# Patient Record
Sex: FEMALE | Race: BLACK OR AFRICAN AMERICAN | Employment: PART TIME | URBAN - METROPOLITAN AREA
[De-identification: names, ages, dates, MRNs, and addresses within clinical notes are randomized per-mention and may not be internally consistent; named-entity substitution may affect disease eponyms.]

---

## 2019-03-27 ENCOUNTER — APPOINTMENT (OUTPATIENT)
Dept: GENERAL RADIOLOGY | Age: 24
End: 2019-03-27

## 2019-03-27 ENCOUNTER — HOSPITAL ENCOUNTER (EMERGENCY)
Age: 24
Discharge: HOME | End: 2019-03-27

## 2019-03-27 VITALS
RESPIRATION RATE: 20 BRPM | WEIGHT: 250 LBS | DIASTOLIC BLOOD PRESSURE: 66 MMHG | OXYGEN SATURATION: 99 % | BODY MASS INDEX: 35.79 KG/M2 | HEART RATE: 90 BPM | HEIGHT: 70 IN | TEMPERATURE: 98.2 F | SYSTOLIC BLOOD PRESSURE: 134 MMHG

## 2019-03-27 DIAGNOSIS — J45.40 MODERATE PERSISTENT ASTHMATIC BRONCHITIS WITHOUT COMPLICATION: Primary | ICD-10-CM

## 2019-03-27 PROCEDURE — 71046 X-RAY EXAM CHEST 2 VIEWS: CPT

## 2019-03-27 PROCEDURE — 6370000000 HC RX 637 (ALT 250 FOR IP): Performed by: PHYSICIAN ASSISTANT

## 2019-03-27 PROCEDURE — 99283 EMERGENCY DEPT VISIT LOW MDM: CPT

## 2019-03-27 RX ORDER — IPRATROPIUM BROMIDE AND ALBUTEROL SULFATE 2.5; .5 MG/3ML; MG/3ML
1 SOLUTION RESPIRATORY (INHALATION)
Status: DISCONTINUED | OUTPATIENT
Start: 2019-03-27 | End: 2019-03-27 | Stop reason: HOSPADM

## 2019-03-27 RX ORDER — IPRATROPIUM BROMIDE AND ALBUTEROL SULFATE 2.5; .5 MG/3ML; MG/3ML
1 SOLUTION RESPIRATORY (INHALATION) ONCE
Status: COMPLETED | OUTPATIENT
Start: 2019-03-27 | End: 2019-03-27

## 2019-03-27 RX ORDER — PREDNISONE 10 MG/1
40 TABLET ORAL DAILY
Qty: 20 TABLET | Refills: 0 | Status: SHIPPED | OUTPATIENT
Start: 2019-03-27 | End: 2019-04-01

## 2019-03-27 RX ORDER — ALBUTEROL SULFATE 90 UG/1
2 AEROSOL, METERED RESPIRATORY (INHALATION) EVERY 6 HOURS PRN
Qty: 1 INHALER | Refills: 0 | Status: SHIPPED | OUTPATIENT
Start: 2019-03-27

## 2019-03-27 RX ORDER — PREDNISONE 20 MG/1
60 TABLET ORAL ONCE
Status: COMPLETED | OUTPATIENT
Start: 2019-03-27 | End: 2019-03-27

## 2019-03-27 RX ORDER — ALBUTEROL SULFATE 90 UG/1
2 AEROSOL, METERED RESPIRATORY (INHALATION) EVERY 6 HOURS PRN
COMMUNITY
End: 2019-03-27 | Stop reason: SDUPTHER

## 2019-03-27 RX ADMIN — IPRATROPIUM BROMIDE AND ALBUTEROL SULFATE 1 AMPULE: .5; 3 SOLUTION RESPIRATORY (INHALATION) at 19:10

## 2019-03-27 RX ADMIN — PREDNISONE 60 MG: 20 TABLET ORAL at 19:35

## 2019-03-27 RX ADMIN — IPRATROPIUM BROMIDE AND ALBUTEROL SULFATE 1 AMPULE: .5; 3 SOLUTION RESPIRATORY (INHALATION) at 19:36

## 2019-03-27 SDOH — HEALTH STABILITY: MENTAL HEALTH: HOW OFTEN DO YOU HAVE A DRINK CONTAINING ALCOHOL?: NEVER

## 2019-03-27 ASSESSMENT — PAIN DESCRIPTION - FREQUENCY: FREQUENCY: CONTINUOUS

## 2019-03-27 ASSESSMENT — PAIN SCALES - GENERAL: PAINLEVEL_OUTOF10: 7

## 2019-03-27 ASSESSMENT — PAIN DESCRIPTION - LOCATION: LOCATION: GENERALIZED

## 2019-03-27 ASSESSMENT — PAIN DESCRIPTION - DESCRIPTORS: DESCRIPTORS: ACHING

## 2019-03-27 ASSESSMENT — PAIN DESCRIPTION - PAIN TYPE: TYPE: ACUTE PAIN

## 2021-06-24 ENCOUNTER — TELEPHONE (OUTPATIENT)
Dept: ADMINISTRATIVE | Age: 26
End: 2021-06-24

## 2024-06-18 ENCOUNTER — ANCILLARY PROCEDURE (OUTPATIENT)
Dept: OBGYN CLINIC | Age: 29
End: 2024-06-18
Payer: MEDICAID

## 2024-06-18 ENCOUNTER — INITIAL PRENATAL (OUTPATIENT)
Dept: OBGYN CLINIC | Age: 29
End: 2024-06-18
Payer: MEDICAID

## 2024-06-18 VITALS
SYSTOLIC BLOOD PRESSURE: 124 MMHG | WEIGHT: 245 LBS | DIASTOLIC BLOOD PRESSURE: 80 MMHG | HEART RATE: 98 BPM | BODY MASS INDEX: 35.15 KG/M2

## 2024-06-18 DIAGNOSIS — Z98.890 HISTORY OF LOOP ELECTROSURGICAL EXCISION PROCEDURE (LEEP) OF CERVIX AFFECTING PREGNANCY IN SECOND TRIMESTER: ICD-10-CM

## 2024-06-18 DIAGNOSIS — O34.42 HISTORY OF LOOP ELECTROSURGICAL EXCISION PROCEDURE (LEEP) OF CERVIX AFFECTING PREGNANCY IN SECOND TRIMESTER: ICD-10-CM

## 2024-06-18 DIAGNOSIS — N96 HABITUAL ABORTER: ICD-10-CM

## 2024-06-18 DIAGNOSIS — Z34.90 FIFTH PREGNANCY: ICD-10-CM

## 2024-06-18 DIAGNOSIS — Z3A.19 19 WEEKS GESTATION OF PREGNANCY: Primary | ICD-10-CM

## 2024-06-18 DIAGNOSIS — O26.22 HABITUAL ABORTER, CURRENTLY PREGNANT IN SECOND TRIMESTER: ICD-10-CM

## 2024-06-18 DIAGNOSIS — D56.3 ALPHA THALASSEMIA SILENT CARRIER: ICD-10-CM

## 2024-06-18 LAB
GLUCOSE URINE, POC: NORMAL
PROTEIN UA: NEGATIVE

## 2024-06-18 PROCEDURE — 99999 PR OFFICE/OUTPT VISIT,PROCEDURE ONLY: CPT | Performed by: OBSTETRICS & GYNECOLOGY

## 2024-06-18 PROCEDURE — 99203 OFFICE O/P NEW LOW 30 MIN: CPT | Performed by: OBSTETRICS & GYNECOLOGY

## 2024-06-18 PROCEDURE — 76811 OB US DETAILED SNGL FETUS: CPT | Performed by: OBSTETRICS & GYNECOLOGY

## 2024-06-18 PROCEDURE — 76817 TRANSVAGINAL US OBSTETRIC: CPT | Performed by: OBSTETRICS & GYNECOLOGY

## 2024-06-18 PROCEDURE — 81002 URINALYSIS NONAUTO W/O SCOPE: CPT | Performed by: OBSTETRICS & GYNECOLOGY

## 2024-06-18 RX ORDER — ASPIRIN 81 MG/1
81 TABLET, COATED ORAL DAILY
COMMUNITY
Start: 2024-04-26

## 2024-06-18 RX ORDER — DIPHENHYDRAMINE HCL 25 MG
25 TABLET ORAL EVERY 6 HOURS PRN
COMMUNITY

## 2024-06-18 RX ORDER — ONDANSETRON 4 MG/1
4 TABLET, FILM COATED ORAL 4 TIMES DAILY
COMMUNITY
Start: 2024-06-06

## 2024-06-18 RX ORDER — THIAMINE MONONITRATE, RIBOFLAVIN, PYRIDOXINE HYDROCHLORIDE, CYANOCOBALAMIN, ASCORBIC ACID, NIACIN, FOLIC ACID, FERROUS FUMARATE, CALCIUM CARBONATE, CHOLECALCIFEROL, VITAMIN E ACETATE, POTASSIUM IODIDE, ZINC OXIDE, CHOLINE BITARTRATE, AND DOCONEXENT
KIT
COMMUNITY

## 2024-06-18 RX ORDER — FOLIC ACID 1 MG/1
1000 TABLET ORAL DAILY
COMMUNITY
Start: 2024-04-26

## 2024-06-18 NOTE — PROGRESS NOTES
[facility] [place] [facility] [place] [facility]  [address] [place], [place] [address] [address]  Dept: [phone]  Loc: [phone]     2024    RE:  Manasa Spann     : 1995   AGE: 29 y.o.     Dear Dr. Ferguson,    Thank you for allowing me to see Manasa Spann.  As I'm sure you will recall, Manasa Spann is a 29 y.o. [id number] LMP recorded. Patient is pregnant. 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 2024    History of loop electrosurgical excision procedure (LEEP) of cervix affecting pregnancy in second trimester 2024    Alpha thalassemia silent carrier 2024        PAST HISTORY:  OB History    Para Term  AB Living   5       4     SAB IAB Ectopic Molar Multiple Live Births   4                # Outcome Date GA Lbr Oscar/2nd Weight Sex Delivery Anes PTL Lv   5 Current            4 SAB            3 SAB            2 SAB            1 SAB                   MEDICAL:  Past Medical History:   Diagnosis Date    Abnormal Pap smear of cervix     Anemia     Asthma     Depression         SURGICAL:  Past Surgical History:   Procedure Laterality Date    ANKLE SURGERY      LEEP         ALLERGIES:    No Known Allergies      MEDICATIONS:    Current Outpatient Medications   Medication Sig Dispense Refill    folic acid (FOLVITE) 1 MG tablet Take 1 tablet by mouth daily      ondansetron (ZOFRAN) 4 MG tablet Take 1 tablet by mouth in the morning, at noon, in the evening, and at bedtime      ASPIRIN LOW DOSE 81 MG EC tablet Take 1 tablet by mouth daily      Prenatal-FeFum-FA-DHA w/o A (PRENATAL + DHA) 27-1 & 250 MG THPK Take by mouth      diphenhydrAMINE (BENADRYL) 25 MG tablet Take 1 tablet by mouth every 6 hours as needed for Itching

## 2024-06-18 NOTE — PROGRESS NOTES
Patient is here today for ob new visit. Denies any vaginal bleeding now. Having a white vaginal discharge. Noticed some blood when she wiped last week but has been constipated.   Having cramping and pelvic pain.

## 2024-07-02 ENCOUNTER — ANCILLARY PROCEDURE (OUTPATIENT)
Dept: OBGYN CLINIC | Age: 29
End: 2024-07-02
Payer: MEDICAID

## 2024-07-02 ENCOUNTER — ROUTINE PRENATAL (OUTPATIENT)
Dept: OBGYN CLINIC | Age: 29
End: 2024-07-02
Payer: MEDICAID

## 2024-07-02 VITALS
WEIGHT: 247 LBS | BODY MASS INDEX: 35.44 KG/M2 | SYSTOLIC BLOOD PRESSURE: 121 MMHG | HEART RATE: 80 BPM | DIASTOLIC BLOOD PRESSURE: 71 MMHG

## 2024-07-02 DIAGNOSIS — Z98.890 HISTORY OF LOOP ELECTROSURGICAL EXCISION PROCEDURE (LEEP) OF CERVIX AFFECTING PREGNANCY IN SECOND TRIMESTER: ICD-10-CM

## 2024-07-02 DIAGNOSIS — N96 HABITUAL ABORTER: ICD-10-CM

## 2024-07-02 DIAGNOSIS — O34.42 HISTORY OF LOOP ELECTROSURGICAL EXCISION PROCEDURE (LEEP) OF CERVIX AFFECTING PREGNANCY IN SECOND TRIMESTER: ICD-10-CM

## 2024-07-02 DIAGNOSIS — Z3A.21 21 WEEKS GESTATION OF PREGNANCY: Primary | ICD-10-CM

## 2024-07-02 LAB
GLUCOSE URINE, POC: NORMAL
PROTEIN UA: NEGATIVE

## 2024-07-02 PROCEDURE — 99213 OFFICE O/P EST LOW 20 MIN: CPT | Performed by: OBSTETRICS & GYNECOLOGY

## 2024-07-02 PROCEDURE — 76815 OB US LIMITED FETUS(S): CPT | Performed by: OBSTETRICS & GYNECOLOGY

## 2024-07-02 PROCEDURE — 81002 URINALYSIS NONAUTO W/O SCOPE: CPT | Performed by: OBSTETRICS & GYNECOLOGY

## 2024-07-02 PROCEDURE — 99999 PR OFFICE/OUTPT VISIT,PROCEDURE ONLY: CPT | Performed by: OBSTETRICS & GYNECOLOGY

## 2024-07-02 PROCEDURE — 76817 TRANSVAGINAL US OBSTETRIC: CPT | Performed by: OBSTETRICS & GYNECOLOGY

## 2024-07-02 NOTE — PROGRESS NOTES
Patient is here today for 2 wk f/u. Denies any vaginal bleeding. Having vaginal discharge and cramping. Feels like she has to pee but does not.

## 2024-07-02 NOTE — PROGRESS NOTES
[facility] [place] [facility]  [address] [place], [place] [address] [address]  Dept: [phone]  Loc: [phone]     2024    RE:  Manasa Spann     : 1995   AGE: 29 y.o.     Dear Dr. Ferguson,    Thank you for allowing me to see Manasa Spann.  As I'm sure you will recall, Manasa Spann is a 29 y.o. [id number] LMP recorded. Patient is pregnant. 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 2024    History of loop electrosurgical excision procedure (LEEP) of cervix affecting pregnancy in second trimester 2024    Alpha thalassemia silent carrier 2024        PAST HISTORY:  OB History    Para Term  AB Living   5       4     SAB IAB Ectopic Molar Multiple Live Births   4                # Outcome Date GA Lbr Oscar/2nd Weight Sex Delivery Anes PTL Lv   5 Current            4 SAB            3 SAB            2 SAB            1 SAB                   MEDICAL:  Past Medical History:   Diagnosis Date    Abnormal Pap smear of cervix     Anemia     Asthma     Depression         SURGICAL:  Past Surgical History:   Procedure Laterality Date    ANKLE SURGERY      LEEP         ALLERGIES:    No Known Allergies      MEDICATIONS:    Current Outpatient Medications   Medication Sig Dispense Refill    folic acid (FOLVITE) 1 MG tablet Take 1 tablet by mouth daily      ondansetron (ZOFRAN) 4 MG tablet Take 1 tablet by mouth in the morning, at noon, in the evening, and at bedtime      ASPIRIN LOW DOSE 81 MG EC tablet Take 1 tablet by mouth daily      Prenatal-FeFum-FA-DHA w/o A (PRENATAL + DHA) 27-1 & 250 MG THPK Take by mouth      diphenhydrAMINE (BENADRYL) 25 MG tablet Take 1

## 2024-07-09 ENCOUNTER — ANCILLARY PROCEDURE (OUTPATIENT)
Dept: OBGYN CLINIC | Age: 29
End: 2024-07-09
Payer: MEDICAID

## 2024-07-09 ENCOUNTER — ROUTINE PRENATAL (OUTPATIENT)
Dept: OBGYN CLINIC | Age: 29
End: 2024-07-09
Payer: MEDICAID

## 2024-07-09 VITALS
HEART RATE: 80 BPM | SYSTOLIC BLOOD PRESSURE: 120 MMHG | WEIGHT: 249 LBS | BODY MASS INDEX: 35.73 KG/M2 | DIASTOLIC BLOOD PRESSURE: 72 MMHG

## 2024-07-09 DIAGNOSIS — O34.42 HISTORY OF LOOP ELECTROSURGICAL EXCISION PROCEDURE (LEEP) OF CERVIX AFFECTING PREGNANCY IN SECOND TRIMESTER: ICD-10-CM

## 2024-07-09 DIAGNOSIS — Z3A.22 22 WEEKS GESTATION OF PREGNANCY: Primary | ICD-10-CM

## 2024-07-09 DIAGNOSIS — Z98.890 HISTORY OF LOOP ELECTROSURGICAL EXCISION PROCEDURE (LEEP) OF CERVIX AFFECTING PREGNANCY IN SECOND TRIMESTER: ICD-10-CM

## 2024-07-09 DIAGNOSIS — N96 HABITUAL ABORTER: ICD-10-CM

## 2024-07-09 LAB
GLUCOSE URINE, POC: NORMAL
PROTEIN UA: NEGATIVE

## 2024-07-09 PROCEDURE — 81002 URINALYSIS NONAUTO W/O SCOPE: CPT | Performed by: OBSTETRICS & GYNECOLOGY

## 2024-07-09 PROCEDURE — 99213 OFFICE O/P EST LOW 20 MIN: CPT | Performed by: OBSTETRICS & GYNECOLOGY

## 2024-07-09 PROCEDURE — 99999 PR OFFICE/OUTPT VISIT,PROCEDURE ONLY: CPT | Performed by: OBSTETRICS & GYNECOLOGY

## 2024-07-09 PROCEDURE — 76817 TRANSVAGINAL US OBSTETRIC: CPT | Performed by: OBSTETRICS & GYNECOLOGY

## 2024-07-09 PROCEDURE — 76815 OB US LIMITED FETUS(S): CPT | Performed by: OBSTETRICS & GYNECOLOGY

## 2024-07-09 NOTE — PROGRESS NOTES
Patient is here today for 1 wk f/u. Patient's OB may have different due date. Had some vaginal bleeding a few days ago.   Cramping on and off.

## 2024-07-09 NOTE — PROGRESS NOTES
[facility] [place] [facility]  [address] [place], [place] [address] [address]  Dept: [phone]  Loc: [phone]     2024    RE:  Manasa Spann     : 1995   AGE: 29 y.o.     Dear Dr. Ferguson,    Thank you for allowing me to see Manasa Spann.  As I'm sure you will recall, Manasa Spann is a 29 y.o. [id number] LMP recorded. Patient is pregnant. 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 2024    History of loop electrosurgical excision procedure (LEEP) of cervix affecting pregnancy in second trimester 2024    Alpha thalassemia silent carrier 2024        PAST HISTORY:  OB History    Para Term  AB Living   5       4     SAB IAB Ectopic Molar Multiple Live Births   4                # Outcome Date GA Lbr Oscar/2nd Weight Sex Delivery Anes PTL Lv   5 Current            4 SAB            3 SAB            2 SAB            1 SAB                   MEDICAL:  Past Medical History:   Diagnosis Date    Abnormal Pap smear of cervix     Anemia     Asthma     Depression         SURGICAL:  Past Surgical History:   Procedure Laterality Date    ANKLE SURGERY      LEEP         ALLERGIES:    No Known Allergies      MEDICATIONS:    Current Outpatient Medications   Medication Sig Dispense Refill    folic acid (FOLVITE) 1 MG tablet Take 1 tablet by mouth daily      ondansetron (ZOFRAN) 4 MG tablet Take 1 tablet by mouth in the morning, at noon, in the evening, and at bedtime      ASPIRIN LOW DOSE 81 MG EC tablet Take 1 tablet by mouth daily      Prenatal-FeFum-FA-DHA w/o A (PRENATAL + DHA) 27-1 & 250 MG THPK Take by mouth      diphenhydrAMINE (BENADRYL) 25 MG tablet Take 1

## 2024-07-16 ENCOUNTER — ANCILLARY PROCEDURE (OUTPATIENT)
Dept: OBGYN CLINIC | Age: 29
End: 2024-07-16
Payer: MEDICAID

## 2024-07-16 ENCOUNTER — ROUTINE PRENATAL (OUTPATIENT)
Dept: OBGYN CLINIC | Age: 29
End: 2024-07-16
Payer: MEDICAID

## 2024-07-16 VITALS
BODY MASS INDEX: 35.58 KG/M2 | SYSTOLIC BLOOD PRESSURE: 127 MMHG | HEART RATE: 99 BPM | DIASTOLIC BLOOD PRESSURE: 72 MMHG | WEIGHT: 248 LBS

## 2024-07-16 DIAGNOSIS — N96 HABITUAL ABORTER: Primary | ICD-10-CM

## 2024-07-16 DIAGNOSIS — Z98.890 HISTORY OF LOOP ELECTROSURGICAL EXCISION PROCEDURE (LEEP) OF CERVIX AFFECTING PREGNANCY IN SECOND TRIMESTER: ICD-10-CM

## 2024-07-16 DIAGNOSIS — O36.5920 POOR FETAL GROWTH AFFECTING MANAGEMENT OF MOTHER IN SECOND TRIMESTER, SINGLE OR UNSPECIFIED FETUS: ICD-10-CM

## 2024-07-16 DIAGNOSIS — O34.42 HISTORY OF LOOP ELECTROSURGICAL EXCISION PROCEDURE (LEEP) OF CERVIX AFFECTING PREGNANCY IN SECOND TRIMESTER: ICD-10-CM

## 2024-07-16 PROBLEM — Z3A.23 23 WEEKS GESTATION OF PREGNANCY: Status: ACTIVE | Noted: 2024-07-02

## 2024-07-16 LAB
GLUCOSE URINE, POC: NORMAL
PROTEIN UA: NEGATIVE

## 2024-07-16 PROCEDURE — 76816 OB US FOLLOW-UP PER FETUS: CPT | Performed by: OBSTETRICS & GYNECOLOGY

## 2024-07-16 PROCEDURE — 81002 URINALYSIS NONAUTO W/O SCOPE: CPT | Performed by: OBSTETRICS & GYNECOLOGY

## 2024-07-16 PROCEDURE — 99213 OFFICE O/P EST LOW 20 MIN: CPT | Performed by: OBSTETRICS & GYNECOLOGY

## 2024-07-16 PROCEDURE — 99999 PR OFFICE/OUTPT VISIT,PROCEDURE ONLY: CPT | Performed by: OBSTETRICS & GYNECOLOGY

## 2024-07-16 PROCEDURE — 76817 TRANSVAGINAL US OBSTETRIC: CPT | Performed by: OBSTETRICS & GYNECOLOGY

## 2024-07-16 PROCEDURE — H1000 PRENATAL CARE ATRISK ASSESSM: HCPCS | Performed by: OBSTETRICS & GYNECOLOGY

## 2024-07-16 RX ORDER — PANTOPRAZOLE SODIUM 20 MG/1
TABLET, DELAYED RELEASE ORAL
COMMUNITY
Start: 2024-07-02

## 2024-07-16 NOTE — PROGRESS NOTES
[facility] [place] [facility]  [address] [place] [address]  [place] [address]  Dept: [phone]  Loc: [phone]     2024    RE:  Manasa Spann     : 1995   AGE: 29 y.o.     Dear Dr. Ferguson,    Thank you for allowing me to see Manasa Spann.  As I'm sure you will recall, Manasa Spann is a 29 y.o. [id number] LMP recorded. Patient is pregnant. Estimated Date of Delivery: 24 at 23w5d seen in our office today for the following:    REASON FOR VISIT: Growth and cervical length    Patient Active Problem List    Diagnosis Date Noted    Poor fetal growth affecting management of mother in second trimester 2024    23 weeks gestation of pregnancy 2024    Fifth pregnancy 2024    Habitual aborter, currently pregnant in second trimester 2024    History of loop electrosurgical excision procedure (LEEP) of cervix affecting pregnancy in second trimester 2024    Alpha thalassemia silent carrier 2024        PAST HISTORY:  OB History    Para Term  AB Living   5       4     SAB IAB Ectopic Molar Multiple Live Births   4                # Outcome Date GA Lbr Oscar/2nd Weight Sex Delivery Anes PTL Lv   5 Current            4 SAB            3 SAB            2 SAB            1 SAB                   MEDICAL:  Past Medical History:   Diagnosis Date    Abnormal Pap smear of cervix     Anemia     Asthma     Depression         SURGICAL:  Past Surgical History:   Procedure Laterality Date    ANKLE SURGERY      LEEP         ALLERGIES:    No Known Allergies      MEDICATIONS:    Current Outpatient Medications   Medication Sig Dispense Refill    pantoprazole (PROTONIX) 20 MG tablet       folic acid (FOLVITE) 1 MG tablet Take 1 tablet by mouth daily      ondansetron (ZOFRAN) 4 MG tablet Take 1 tablet by mouth in the morning, at noon, in the evening, and at bedtime      ASPIRIN LOW DOSE 81 MG EC tablet Take 1 tablet by mouth daily

## 2024-07-16 NOTE — PROGRESS NOTES
Patient is here today for growth. Denies any vaginal bleeding, or leakage of fluids.  Cramping lower abdominal area right side and moves to top of left side as well.

## 2024-07-23 ENCOUNTER — ANCILLARY PROCEDURE (OUTPATIENT)
Dept: OBGYN CLINIC | Age: 29
End: 2024-07-23
Payer: MEDICAID

## 2024-07-23 ENCOUNTER — ROUTINE PRENATAL (OUTPATIENT)
Dept: OBGYN CLINIC | Age: 29
End: 2024-07-23
Payer: MEDICAID

## 2024-07-23 VITALS
HEART RATE: 93 BPM | BODY MASS INDEX: 36.3 KG/M2 | WEIGHT: 253 LBS | DIASTOLIC BLOOD PRESSURE: 73 MMHG | SYSTOLIC BLOOD PRESSURE: 129 MMHG

## 2024-07-23 DIAGNOSIS — O35.2XX0 HEREDITARY DISEASE IN FAMILY POSSIBLY AFFECTING FETUS, AFFECTING MANAGEMENT OF MOTHER IN PREGNANCY, SINGLE OR UNSPECIFIED FETUS: ICD-10-CM

## 2024-07-23 DIAGNOSIS — O36.5920 POOR FETAL GROWTH AFFECTING MANAGEMENT OF MOTHER IN SECOND TRIMESTER, SINGLE OR UNSPECIFIED FETUS: ICD-10-CM

## 2024-07-23 DIAGNOSIS — O34.42 HISTORY OF LOOP ELECTROSURGICAL EXCISION PROCEDURE (LEEP) OF CERVIX AFFECTING PREGNANCY IN SECOND TRIMESTER: ICD-10-CM

## 2024-07-23 DIAGNOSIS — O26.22 HABITUAL ABORTER, CURRENTLY PREGNANT IN SECOND TRIMESTER: ICD-10-CM

## 2024-07-23 DIAGNOSIS — O99.210 OBESITY AFFECTING PREGNANCY, ANTEPARTUM, UNSPECIFIED OBESITY TYPE: ICD-10-CM

## 2024-07-23 DIAGNOSIS — D56.3 ALPHA THALASSEMIA SILENT CARRIER: ICD-10-CM

## 2024-07-23 DIAGNOSIS — Z98.890 HISTORY OF LOOP ELECTROSURGICAL EXCISION PROCEDURE (LEEP) OF CERVIX AFFECTING PREGNANCY IN SECOND TRIMESTER: ICD-10-CM

## 2024-07-23 DIAGNOSIS — Z3A.24 24 WEEKS GESTATION OF PREGNANCY: ICD-10-CM

## 2024-07-23 LAB
GLUCOSE URINE, POC: NORMAL
PROTEIN UA: NEGATIVE

## 2024-07-23 PROCEDURE — 99214 OFFICE O/P EST MOD 30 MIN: CPT | Performed by: OBSTETRICS & GYNECOLOGY

## 2024-07-23 PROCEDURE — 1036F TOBACCO NON-USER: CPT | Performed by: OBSTETRICS & GYNECOLOGY

## 2024-07-23 PROCEDURE — 76819 FETAL BIOPHYS PROFIL W/O NST: CPT | Performed by: OBSTETRICS & GYNECOLOGY

## 2024-07-23 PROCEDURE — 76821 MIDDLE CEREBRAL ARTERY ECHO: CPT | Performed by: OBSTETRICS & GYNECOLOGY

## 2024-07-23 PROCEDURE — 76817 TRANSVAGINAL US OBSTETRIC: CPT | Performed by: OBSTETRICS & GYNECOLOGY

## 2024-07-23 PROCEDURE — 81002 URINALYSIS NONAUTO W/O SCOPE: CPT | Performed by: OBSTETRICS & GYNECOLOGY

## 2024-07-23 PROCEDURE — G8427 DOCREV CUR MEDS BY ELIG CLIN: HCPCS | Performed by: OBSTETRICS & GYNECOLOGY

## 2024-07-23 PROCEDURE — 76815 OB US LIMITED FETUS(S): CPT | Performed by: OBSTETRICS & GYNECOLOGY

## 2024-07-23 PROCEDURE — G8419 CALC BMI OUT NRM PARAM NOF/U: HCPCS | Performed by: OBSTETRICS & GYNECOLOGY

## 2024-07-23 PROCEDURE — 99213 OFFICE O/P EST LOW 20 MIN: CPT | Performed by: OBSTETRICS & GYNECOLOGY

## 2024-07-23 PROCEDURE — 76820 UMBILICAL ARTERY ECHO: CPT | Performed by: OBSTETRICS & GYNECOLOGY

## 2024-07-23 NOTE — PROGRESS NOTES
Patient is here today for 1 wk f/u. Denies any vaginal bleeding, or leakage of fluids.  Cramping on and off.   Slight movement.   
recommendations  Reviewing available records, results of all previously ordered testing/procedures, and current problem list  Counseling/educating the patient  Coordinating care with other healthcare professionals  Communicating results to the patient's family/caregiver  Documenting clinical information in the patient's electronic health record   I answered all of her questions to her satisfaction.   I asked her to call if she had any additional questions.       If you have any questions regarding her management, please contact me at your convenience and thank you for allowing me to participate in her care.    Sincerely,        Shaheed Smith MD, MS, FACOG, FACS, RDCS, RDMS, RVT  Director Maternal-Fetal Medicine  [facility]  [phone]

## 2024-07-30 ENCOUNTER — ANCILLARY PROCEDURE (OUTPATIENT)
Dept: OBGYN CLINIC | Age: 29
End: 2024-07-30
Payer: MEDICAID

## 2024-07-30 ENCOUNTER — ROUTINE PRENATAL (OUTPATIENT)
Dept: OBGYN CLINIC | Age: 29
End: 2024-07-30
Payer: MEDICAID

## 2024-07-30 VITALS
DIASTOLIC BLOOD PRESSURE: 75 MMHG | WEIGHT: 250 LBS | SYSTOLIC BLOOD PRESSURE: 130 MMHG | BODY MASS INDEX: 35.87 KG/M2 | HEART RATE: 96 BPM

## 2024-07-30 DIAGNOSIS — O36.5920 POOR FETAL GROWTH AFFECTING MANAGEMENT OF MOTHER IN SECOND TRIMESTER, SINGLE OR UNSPECIFIED FETUS: ICD-10-CM

## 2024-07-30 DIAGNOSIS — Z3A.25 25 WEEKS GESTATION OF PREGNANCY: Primary | ICD-10-CM

## 2024-07-30 LAB
GLUCOSE URINE, POC: NORMAL
PROTEIN UA: NEGATIVE

## 2024-07-30 PROCEDURE — 81002 URINALYSIS NONAUTO W/O SCOPE: CPT | Performed by: OBSTETRICS & GYNECOLOGY

## 2024-07-30 PROCEDURE — 76821 MIDDLE CEREBRAL ARTERY ECHO: CPT | Performed by: OBSTETRICS & GYNECOLOGY

## 2024-07-30 PROCEDURE — 76820 UMBILICAL ARTERY ECHO: CPT | Performed by: OBSTETRICS & GYNECOLOGY

## 2024-07-30 PROCEDURE — 99213 OFFICE O/P EST LOW 20 MIN: CPT | Performed by: OBSTETRICS & GYNECOLOGY

## 2024-07-30 PROCEDURE — 76819 FETAL BIOPHYS PROFIL W/O NST: CPT | Performed by: OBSTETRICS & GYNECOLOGY

## 2024-07-30 PROCEDURE — 76815 OB US LIMITED FETUS(S): CPT | Performed by: OBSTETRICS & GYNECOLOGY

## 2024-07-30 PROCEDURE — 99999 PR OFFICE/OUTPT VISIT,PROCEDURE ONLY: CPT | Performed by: OBSTETRICS & GYNECOLOGY

## 2024-08-06 ENCOUNTER — ROUTINE PRENATAL (OUTPATIENT)
Dept: OBGYN CLINIC | Age: 29
End: 2024-08-06
Payer: MEDICAID

## 2024-08-06 ENCOUNTER — ANCILLARY PROCEDURE (OUTPATIENT)
Dept: OBGYN CLINIC | Age: 29
End: 2024-08-06
Payer: MEDICAID

## 2024-08-06 VITALS
HEART RATE: 84 BPM | WEIGHT: 251 LBS | BODY MASS INDEX: 36.01 KG/M2 | SYSTOLIC BLOOD PRESSURE: 113 MMHG | DIASTOLIC BLOOD PRESSURE: 75 MMHG

## 2024-08-06 DIAGNOSIS — Z3A.26 26 WEEKS GESTATION OF PREGNANCY: Primary | ICD-10-CM

## 2024-08-06 DIAGNOSIS — O36.5920 POOR FETAL GROWTH AFFECTING MANAGEMENT OF MOTHER IN SECOND TRIMESTER, SINGLE OR UNSPECIFIED FETUS: ICD-10-CM

## 2024-08-06 PROCEDURE — 76818 FETAL BIOPHYS PROFILE W/NST: CPT | Performed by: OBSTETRICS & GYNECOLOGY

## 2024-08-06 PROCEDURE — 99213 OFFICE O/P EST LOW 20 MIN: CPT | Performed by: OBSTETRICS & GYNECOLOGY

## 2024-08-06 PROCEDURE — 99999 PR OFFICE/OUTPT VISIT,PROCEDURE ONLY: CPT | Performed by: OBSTETRICS & GYNECOLOGY

## 2024-08-06 PROCEDURE — 76816 OB US FOLLOW-UP PER FETUS: CPT | Performed by: OBSTETRICS & GYNECOLOGY

## 2024-08-06 PROCEDURE — 76820 UMBILICAL ARTERY ECHO: CPT | Performed by: OBSTETRICS & GYNECOLOGY

## 2024-08-06 NOTE — PROGRESS NOTES
[facility] [place] [facility]  [address] [place] [address]  [place] [address]  Dept: [phone]  Loc: [phone]     2024    RE:  Manasa Spann     : 1995   AGE: 29 y.o.     Dear Dr. Ferguson,    Thank you for allowing me to see Manasa Spann.  As I'm sure you will recall, Manasa Spann is a 29 y.o. [id number] LMP recorded. Patient is pregnant. Estimated Date of Delivery: 24 at 26w5d seen in our office today for the following:    REASON FOR VISIT: Growth, BPP, NST, umbilical Dopplers    Patient Active Problem List    Diagnosis Date Noted    26 weeks gestation of pregnancy 2024    Maternal obesity affecting pregnancy, antepartum 2024    Hereditary disease in family possibly affecting fetus 2024    Poor fetal growth affecting management of mother in second trimester 2024    Fifth pregnancy 2024    Habitual aborter, currently pregnant in second trimester 2024    History of loop electrosurgical excision procedure (LEEP) of cervix affecting pregnancy in second trimester 2024    Alpha thalassemia silent carrier 2024        PAST HISTORY:  OB History    Para Term  AB Living   5       4     SAB IAB Ectopic Molar Multiple Live Births   4                # Outcome Date GA Lbr Oscar/2nd Weight Sex Delivery Anes PTL Lv   5 Current            4 SAB            3 SAB            2 SAB            1 SAB                   MEDICAL:  Past Medical History:   Diagnosis Date    Abnormal Pap smear of cervix     Anemia     Asthma     Depression         SURGICAL:  Past Surgical History:   Procedure Laterality Date    ANKLE SURGERY      LEEP         ALLERGIES:    No Known Allergies      MEDICATIONS:    Current Outpatient Medications   Medication Sig Dispense Refill    pantoprazole (PROTONIX) 20 MG tablet       folic acid (FOLVITE) 1 MG tablet Take 1 tablet by mouth daily      ondansetron (ZOFRAN) 4 MG tablet Take

## 2024-08-06 NOTE — PROGRESS NOTES
Patient is here today for growth. Denies any vaginal bleeding, or leakage of fluids.  Cramping bad since yesterday after lifting something heavy.   Slight movement.

## 2024-08-20 ENCOUNTER — ROUTINE PRENATAL (OUTPATIENT)
Dept: OBGYN CLINIC | Age: 29
End: 2024-08-20
Payer: MEDICAID

## 2024-08-20 ENCOUNTER — ANCILLARY PROCEDURE (OUTPATIENT)
Dept: OBGYN CLINIC | Age: 29
End: 2024-08-20
Payer: MEDICAID

## 2024-08-20 VITALS
HEART RATE: 97 BPM | BODY MASS INDEX: 35.44 KG/M2 | SYSTOLIC BLOOD PRESSURE: 128 MMHG | DIASTOLIC BLOOD PRESSURE: 74 MMHG | WEIGHT: 247 LBS

## 2024-08-20 DIAGNOSIS — D56.3 ALPHA THALASSEMIA SILENT CARRIER: ICD-10-CM

## 2024-08-20 DIAGNOSIS — Z98.890 HISTORY OF LOOP ELECTROSURGICAL EXCISION PROCEDURE (LEEP) OF CERVIX AFFECTING PREGNANCY IN SECOND TRIMESTER: ICD-10-CM

## 2024-08-20 DIAGNOSIS — Z3A.28 28 WEEKS GESTATION OF PREGNANCY: ICD-10-CM

## 2024-08-20 DIAGNOSIS — O36.5920 POOR FETAL GROWTH AFFECTING MANAGEMENT OF MOTHER IN SECOND TRIMESTER, SINGLE OR UNSPECIFIED FETUS: Primary | ICD-10-CM

## 2024-08-20 DIAGNOSIS — O26.23 HABITUAL ABORTER, ANTEPARTUM, THIRD TRIMESTER: ICD-10-CM

## 2024-08-20 DIAGNOSIS — O99.210 OBESITY AFFECTING PREGNANCY, ANTEPARTUM, UNSPECIFIED OBESITY TYPE: ICD-10-CM

## 2024-08-20 DIAGNOSIS — O34.42 HISTORY OF LOOP ELECTROSURGICAL EXCISION PROCEDURE (LEEP) OF CERVIX AFFECTING PREGNANCY IN SECOND TRIMESTER: ICD-10-CM

## 2024-08-20 DIAGNOSIS — O35.2XX0 HEREDITARY DISEASE IN FAMILY POSSIBLY AFFECTING FETUS, AFFECTING MANAGEMENT OF MOTHER IN PREGNANCY, SINGLE OR UNSPECIFIED FETUS: ICD-10-CM

## 2024-08-20 PROCEDURE — 76819 FETAL BIOPHYS PROFIL W/O NST: CPT | Performed by: OBSTETRICS & GYNECOLOGY

## 2024-08-20 PROCEDURE — 99213 OFFICE O/P EST LOW 20 MIN: CPT | Performed by: OBSTETRICS & GYNECOLOGY

## 2024-08-20 PROCEDURE — 76816 OB US FOLLOW-UP PER FETUS: CPT | Performed by: OBSTETRICS & GYNECOLOGY

## 2024-08-20 PROCEDURE — 76820 UMBILICAL ARTERY ECHO: CPT | Performed by: OBSTETRICS & GYNECOLOGY

## 2024-08-20 PROCEDURE — 76821 MIDDLE CEREBRAL ARTERY ECHO: CPT | Performed by: OBSTETRICS & GYNECOLOGY

## 2024-08-20 NOTE — PROGRESS NOTES
Patient is here today for bpp/nst. Patient is concerned about kick counts, patient states baby moves but does not meant counts all the time.  Did have some vaginal spotting last week.   Cramping on and off, feels like period cramps.   Legs feels like they are tingling, noticed more at night.

## 2024-08-21 NOTE — PROGRESS NOTES
24    Dony Ferguson MD  [address] [place] [address]     RE:  MANASA SPANN  : 1995   AGE: 29 y.o.    This report has been created using voice recognition software. It may contain errors which are inherent in voice recognition technology.    Dear Dr. Ferguson:    Manasa Spann had a fetal ultrasound evaluation and fetal testing performed today for the following indications:    Patient Active Problem List   Diagnosis    Fifth pregnancy    History of loop electrosurgical excision procedure (LEEP) of cervix affecting pregnancy in second trimester    Alpha thalassemia silent carrier    Poor fetal growth affecting management of mother in second trimester    Maternal obesity affecting pregnancy, antepartum    Hereditary disease in family possibly affecting fetus    Habitual aborter, antepartum, third trimester     Manasa Spann is a 29 y.o. female, who is G5(0,0,4,0). She has an Estimated Date of Delivery: 2024 based on her established dates.  She is currently 28 weeks 5 days gestation based on that assessment.     The patient complained of decreased fetal movement and uterine contractions.  She has had no vaginal bleeding or leaking of amniotic fluid.  She had a fetal ultrasound evaluation performed on 2024 which demonstrated suboptimal fetal growth.  The BPD was less than the first growth percentile.  The head circumference was at the 8th growth percentile.  The abdominal circumference was at the 5th growth percentile.  The overall weight was 524 grams consistent with the 29th growth percentile.  The findings were consistent with poor fetal growth.    The results of Panorama screen were negative. I previously advised her that this a screening test not a diagnostic test. I advised her that the test screens only for trisomy 21, 18 and 13. We discussed the sensitivity of the test which I advised the patient is as follows:      99.99% for detection of trisomy 21 with

## 2024-08-23 ENCOUNTER — ROUTINE PRENATAL (OUTPATIENT)
Dept: OBGYN CLINIC | Age: 29
End: 2024-08-23
Payer: MEDICAID

## 2024-08-23 ENCOUNTER — ANCILLARY PROCEDURE (OUTPATIENT)
Dept: OBGYN CLINIC | Age: 29
End: 2024-08-23
Payer: MEDICAID

## 2024-08-23 VITALS
DIASTOLIC BLOOD PRESSURE: 71 MMHG | HEIGHT: 69 IN | SYSTOLIC BLOOD PRESSURE: 125 MMHG | WEIGHT: 247.2 LBS | HEART RATE: 84 BPM | BODY MASS INDEX: 36.61 KG/M2

## 2024-08-23 DIAGNOSIS — O35.2XX0 HEREDITARY DISEASE IN FAMILY POSSIBLY AFFECTING FETUS, AFFECTING MANAGEMENT OF MOTHER IN PREGNANCY, SINGLE OR UNSPECIFIED FETUS: ICD-10-CM

## 2024-08-23 DIAGNOSIS — Z98.890 HISTORY OF LOOP ELECTROSURGICAL EXCISION PROCEDURE (LEEP) OF CERVIX AFFECTING PREGNANCY IN SECOND TRIMESTER: ICD-10-CM

## 2024-08-23 DIAGNOSIS — O34.42 HISTORY OF LOOP ELECTROSURGICAL EXCISION PROCEDURE (LEEP) OF CERVIX AFFECTING PREGNANCY IN SECOND TRIMESTER: ICD-10-CM

## 2024-08-23 DIAGNOSIS — O36.5920 POOR FETAL GROWTH AFFECTING MANAGEMENT OF MOTHER IN SECOND TRIMESTER, SINGLE OR UNSPECIFIED FETUS: ICD-10-CM

## 2024-08-23 DIAGNOSIS — O26.23 HABITUAL ABORTER, ANTEPARTUM, THIRD TRIMESTER: ICD-10-CM

## 2024-08-23 DIAGNOSIS — O99.210 OBESITY AFFECTING PREGNANCY, ANTEPARTUM, UNSPECIFIED OBESITY TYPE: ICD-10-CM

## 2024-08-23 DIAGNOSIS — O26.873 SHORT CERVIX DURING PREGNANCY IN THIRD TRIMESTER: ICD-10-CM

## 2024-08-23 DIAGNOSIS — D56.3 ALPHA THALASSEMIA SILENT CARRIER: ICD-10-CM

## 2024-08-23 DIAGNOSIS — Z3A.29 29 WEEKS GESTATION OF PREGNANCY: Primary | ICD-10-CM

## 2024-08-23 LAB
GLUCOSE URINE, POC: NEGATIVE
PROTEIN UA: NEGATIVE

## 2024-08-23 PROCEDURE — 76818 FETAL BIOPHYS PROFILE W/NST: CPT | Performed by: OBSTETRICS & GYNECOLOGY

## 2024-08-23 PROCEDURE — 76821 MIDDLE CEREBRAL ARTERY ECHO: CPT | Performed by: OBSTETRICS & GYNECOLOGY

## 2024-08-23 PROCEDURE — 76820 UMBILICAL ARTERY ECHO: CPT | Performed by: OBSTETRICS & GYNECOLOGY

## 2024-08-23 PROCEDURE — 99213 OFFICE O/P EST LOW 20 MIN: CPT | Performed by: OBSTETRICS & GYNECOLOGY

## 2024-08-23 PROCEDURE — 81002 URINALYSIS NONAUTO W/O SCOPE: CPT | Performed by: OBSTETRICS & GYNECOLOGY

## 2024-08-23 PROCEDURE — 76815 OB US LIMITED FETUS(S): CPT | Performed by: OBSTETRICS & GYNECOLOGY

## 2024-08-23 PROCEDURE — 76817 TRANSVAGINAL US OBSTETRIC: CPT | Performed by: OBSTETRICS & GYNECOLOGY

## 2024-08-23 NOTE — PATIENT INSTRUCTIONS
health, and be sure to contact your doctor if:  You have vaginal discharge that smells bad.  You feel sad, anxious, or hopeless for more than a few days.  You have skin changes, such as a rash, itching, or a yellow color to your skin.  You have other concerns about your pregnancy.  If you have labor signs at 37 weeks or more  If you have signs of labor at 37 weeks or more, your doctor may tell you to call when your labor becomes more active. Symptoms of active labor include:  Contractions that are regular.  Contractions that are less than 5 minutes apart.  Contractions that are hard to talk through.  Follow-up care is a key part of your treatment and safety. Be sure to make and go to all appointments, and call your doctor if you are having problems. It's also a good idea to know your test results and keep a list of the medicines you take.  Where can you learn more?  Go to https://www.[employer].net/patientEd and enter N531 to learn more about \"Learning About When to Call Your Doctor During Pregnancy (After 20 Weeks).\"  Current as of: July 10, 2023  Content Version: 14.1  © 1681-8239 [employer].   Care instructions adapted under license by [employer]. If you have questions about a medical condition or this instruction, always ask your healthcare professional. [employer] disclaims any warranty or liability for your use of this information.

## 2024-08-24 NOTE — PROGRESS NOTES
Manasa Spann presents to office today for BPP/NST, hospital F/U.  Patient denies bleeding, LOF, or contractions.  Positive fetal movement.     
biophysical profile was scored 10 of 10.  The cord Doppler SD ratio was 2.38, consistent with the 16th percentile for the patient's gestational age.  There was no absence or reversal of end-diastolic flow in the samples evaluated.  The MCA PSV was 1.28 MoM.  This value is not associated with fetal anemia or polycythemia.  The CPR PI was 2.85.  This value is not associated with compensatory centralization of fetal blood flow.                     GENETIC SCREENING/TERATOLOGY COUNSELING                  (Includes patient, FTB, and any affected family members)    Patient Age > 35 Years NO   Thalassemia ( MVC<80) NO   Congential Heart Defect NO   Neural Tube Defect NO   Eddie-Sachs NO   Sickle Cell Disease NO   Sickle Cell Trait NO   Sickle C Disease or Trait NO   Hemophilia NO   Muscular Dystrophy NO   Cystic Fibrosis NO   [place] Disease NO   Autism NO   Mental Retardation NO   History of Fragile X NO   Maternal Diabetes NO   Other Genetic Disease or Syndrome NO   Previous Child With Congenital Abnormality Not Listed NO   Recreational Drugs NO                                        INFECTION HISTORY     HEPATITIS IMMUNIZED YES   HEPATITIS INFECTION NO   EXPOSURE TO TB NO   GENITAL HERPES  NO   PARVOVIRUS B-19 NO   CHICKEN POX  YES   MEASLES NO   HIV NO     OB History    Para Term  AB Living   5       4     SAB IAB Ectopic Molar Multiple Live Births   4                # Outcome Date GA Lbr Oscar/2nd Weight Sex Type Anes PTL Lv   5 Current            4 SAB            3 SAB            2 SAB            1 SAB              PAST GYNECOLOGICAL  HISTORY:  Positive for abnormal pap smears. Doesn't know the grade.   Negative for sexually transmitted diseases.   Positive for cervical LEEP  Negative for uterine surgery.   Negative for ovarian or tubal surgery.     Past Medical History:   Diagnosis Date    Abnormal Pap smear of cervix     Anemia     Asthma     Depression        Past Surgical History:   Procedure

## 2024-08-25 PROBLEM — O26.873 SHORT CERVIX DURING PREGNANCY IN THIRD TRIMESTER: Status: ACTIVE | Noted: 2024-08-25

## 2024-08-28 ENCOUNTER — ROUTINE PRENATAL (OUTPATIENT)
Dept: OBGYN CLINIC | Age: 29
End: 2024-08-28
Payer: MEDICAID

## 2024-08-28 ENCOUNTER — ANCILLARY PROCEDURE (OUTPATIENT)
Dept: OBGYN CLINIC | Age: 29
End: 2024-08-28
Payer: MEDICAID

## 2024-08-28 VITALS
DIASTOLIC BLOOD PRESSURE: 75 MMHG | BODY MASS INDEX: 37.8 KG/M2 | SYSTOLIC BLOOD PRESSURE: 120 MMHG | HEART RATE: 91 BPM | WEIGHT: 256 LBS

## 2024-08-28 DIAGNOSIS — O36.5920 POOR FETAL GROWTH AFFECTING MANAGEMENT OF MOTHER IN SECOND TRIMESTER, SINGLE OR UNSPECIFIED FETUS: ICD-10-CM

## 2024-08-28 DIAGNOSIS — O99.210 OBESITY AFFECTING PREGNANCY, ANTEPARTUM, UNSPECIFIED OBESITY TYPE: ICD-10-CM

## 2024-08-28 DIAGNOSIS — Z3A.29 29 WEEKS GESTATION OF PREGNANCY: ICD-10-CM

## 2024-08-28 DIAGNOSIS — O26.23 HABITUAL ABORTER, ANTEPARTUM, THIRD TRIMESTER: Primary | ICD-10-CM

## 2024-08-28 LAB
GLUCOSE URINE, POC: NEGATIVE
PROTEIN UA: NEGATIVE

## 2024-08-28 PROCEDURE — 81002 URINALYSIS NONAUTO W/O SCOPE: CPT | Performed by: OBSTETRICS & GYNECOLOGY

## 2024-08-28 PROCEDURE — 99213 OFFICE O/P EST LOW 20 MIN: CPT | Performed by: OBSTETRICS & GYNECOLOGY

## 2024-08-28 PROCEDURE — 76820 UMBILICAL ARTERY ECHO: CPT | Performed by: OBSTETRICS & GYNECOLOGY

## 2024-08-28 PROCEDURE — 99999 PR OFFICE/OUTPT VISIT,PROCEDURE ONLY: CPT | Performed by: OBSTETRICS & GYNECOLOGY

## 2024-08-28 PROCEDURE — 76818 FETAL BIOPHYS PROFILE W/NST: CPT | Performed by: OBSTETRICS & GYNECOLOGY

## 2024-08-28 NOTE — PROGRESS NOTES
Manasa Spann presents to office today for US/NST.  Patient denies bleeding, LOF, or contractions.  Positive fetal movement.   States she does have cramping off and on.

## 2024-08-28 NOTE — PROGRESS NOTES
[place] [facility]   [facility] [place] [facility]  [address] [facility] [address]  Dept: [phone]  Loc: [phone]     2024    RE:  Manasa Spann     : 1995   AGE: 29 y.o.     Dear Dr. Ferguson,    Thank you for allowing me to see Manasa Spann.  As I'm sure you will recall, Manasa Sapnn is a 29 y.o. [id number] LMP recorded. Patient is pregnant. Estimated Date of Delivery: 24 at 29w6d seen in our office today for the following:    REASON FOR VISIT: BPP and Dopplers    Patient Active Problem List    Diagnosis Date Noted    29 weeks gestation of pregnancy 2024    Short cervix during pregnancy in third trimester 2024    Habitual aborter, antepartum, third trimester 2024    Maternal obesity affecting pregnancy, antepartum 2024    Hereditary disease in family possibly affecting fetus 2024    Poor fetal growth affecting management of mother in second trimester 2024    Fifth pregnancy 2024    History of loop electrosurgical excision procedure (LEEP) of cervix affecting pregnancy in second trimester 2024    Alpha thalassemia silent carrier 2024        PAST HISTORY:  OB History    Para Term  AB Living   5       4     SAB IAB Ectopic Molar Multiple Live Births   4                # Outcome Date GA Lbr Oscar/2nd Weight Sex Type Anes PTL Lv   5 Current            4 SAB            3 SAB            2 SAB            1 SAB                   MEDICAL:  Past Medical History:   Diagnosis Date    Abnormal Pap smear of cervix     Anemia     Asthma     Depression         SURGICAL:  Past Surgical History:   Procedure Laterality Date    ANKLE SURGERY      LEEP         ALLERGIES:    No Known Allergies      MEDICATIONS:    Current Outpatient Medications   Medication Sig Dispense Refill    pantoprazole (PROTONIX) 20 MG tablet       folic acid (FOLVITE) 1 MG tablet Take 1 tablet by

## 2024-09-03 ENCOUNTER — ROUTINE PRENATAL (OUTPATIENT)
Dept: OBGYN CLINIC | Age: 29
End: 2024-09-03
Payer: MEDICAID

## 2024-09-03 ENCOUNTER — ANCILLARY PROCEDURE (OUTPATIENT)
Dept: OBGYN CLINIC | Age: 29
End: 2024-09-03
Payer: MEDICAID

## 2024-09-03 VITALS
WEIGHT: 248.2 LBS | DIASTOLIC BLOOD PRESSURE: 76 MMHG | RESPIRATION RATE: 16 BRPM | BODY MASS INDEX: 35.53 KG/M2 | HEART RATE: 95 BPM | SYSTOLIC BLOOD PRESSURE: 128 MMHG | HEIGHT: 70 IN

## 2024-09-03 DIAGNOSIS — O36.5920 POOR FETAL GROWTH AFFECTING MANAGEMENT OF MOTHER IN SECOND TRIMESTER, SINGLE OR UNSPECIFIED FETUS: ICD-10-CM

## 2024-09-03 DIAGNOSIS — Z3A.30 30 WEEKS GESTATION OF PREGNANCY: Primary | ICD-10-CM

## 2024-09-03 DIAGNOSIS — O26.23 HABITUAL ABORTER, ANTEPARTUM, THIRD TRIMESTER: ICD-10-CM

## 2024-09-03 PROCEDURE — 76818 FETAL BIOPHYS PROFILE W/NST: CPT | Performed by: OBSTETRICS & GYNECOLOGY

## 2024-09-03 PROCEDURE — 99213 OFFICE O/P EST LOW 20 MIN: CPT | Performed by: OBSTETRICS & GYNECOLOGY

## 2024-09-03 PROCEDURE — 76820 UMBILICAL ARTERY ECHO: CPT | Performed by: OBSTETRICS & GYNECOLOGY

## 2024-09-03 PROCEDURE — 81002 URINALYSIS NONAUTO W/O SCOPE: CPT | Performed by: OBSTETRICS & GYNECOLOGY

## 2024-09-03 PROCEDURE — 99999 PR OFFICE/OUTPT VISIT,PROCEDURE ONLY: CPT | Performed by: OBSTETRICS & GYNECOLOGY

## 2024-09-03 NOTE — PROGRESS NOTES
[place] [facility]   [facility] [place] [facility]  [address] [facility] [place] [address]  Dept: [phone]  Loc: [phone]     9/3/2024    RE:  Manasa Spann     : 1995   AGE: 29 y.o.     Dear Dr. Ferguson,    Thank you for allowing me to see Manasa Spann.  As I'm sure you will recall, Manasa Spann is a 29 y.o. [id number] LMP recorded. Patient is pregnant. Estimated Date of Delivery: 24 at 30w5d seen in our office today for the following:    REASON FOR VISIT: BPP and Dopplers    Patient Active Problem List    Diagnosis Date Noted    30 weeks gestation of pregnancy 2024    Short cervix during pregnancy in third trimester 2024    Habitual aborter, antepartum, third trimester 2024    Maternal obesity affecting pregnancy, antepartum 2024    Hereditary disease in family possibly affecting fetus 2024    Poor fetal growth affecting management of mother in second trimester 2024    Fifth pregnancy 2024    History of loop electrosurgical excision procedure (LEEP) of cervix affecting pregnancy in second trimester 2024    Alpha thalassemia silent carrier 2024        PAST HISTORY:  OB History    Para Term  AB Living   5       4     SAB IAB Ectopic Molar Multiple Live Births   4                # Outcome Date GA Lbr Oscar/2nd Weight Sex Type Anes PTL Lv   5 Current            4 SAB            3 SAB            2 SAB            1 SAB                   MEDICAL:  Past Medical History:   Diagnosis Date    Abnormal Pap smear of cervix     Anemia     Asthma     Depression         SURGICAL:  Past Surgical History:   Procedure Laterality Date    ANKLE SURGERY      LEEP         ALLERGIES:    No Known Allergies      MEDICATIONS:    Current Outpatient Medications   Medication Sig Dispense Refill    pantoprazole (PROTONIX) 20 MG tablet       folic acid (FOLVITE) 1 MG tablet Take 1 tablet by

## 2024-09-11 ENCOUNTER — ANCILLARY PROCEDURE (OUTPATIENT)
Dept: OBGYN CLINIC | Age: 29
End: 2024-09-11
Payer: MEDICAID

## 2024-09-11 ENCOUNTER — ROUTINE PRENATAL (OUTPATIENT)
Dept: OBGYN CLINIC | Age: 29
End: 2024-09-11
Payer: MEDICAID

## 2024-09-11 VITALS
DIASTOLIC BLOOD PRESSURE: 73 MMHG | BODY MASS INDEX: 36.01 KG/M2 | WEIGHT: 251 LBS | HEART RATE: 95 BPM | SYSTOLIC BLOOD PRESSURE: 115 MMHG

## 2024-09-11 DIAGNOSIS — Z3A.31 31 WEEKS GESTATION OF PREGNANCY: Primary | ICD-10-CM

## 2024-09-11 DIAGNOSIS — O36.5930 POOR FETAL GROWTH AFFECTING MANAGEMENT OF MOTHER IN THIRD TRIMESTER, SINGLE OR UNSPECIFIED FETUS: ICD-10-CM

## 2024-09-11 PROCEDURE — 99999 PR OFFICE/OUTPT VISIT,PROCEDURE ONLY: CPT | Performed by: OBSTETRICS & GYNECOLOGY

## 2024-09-11 PROCEDURE — 76818 FETAL BIOPHYS PROFILE W/NST: CPT | Performed by: OBSTETRICS & GYNECOLOGY

## 2024-09-11 PROCEDURE — 76816 OB US FOLLOW-UP PER FETUS: CPT | Performed by: OBSTETRICS & GYNECOLOGY

## 2024-09-11 PROCEDURE — 76820 UMBILICAL ARTERY ECHO: CPT | Performed by: OBSTETRICS & GYNECOLOGY

## 2024-09-11 PROCEDURE — 99213 OFFICE O/P EST LOW 20 MIN: CPT | Performed by: OBSTETRICS & GYNECOLOGY

## 2024-09-18 ENCOUNTER — ROUTINE PRENATAL (OUTPATIENT)
Dept: OBGYN CLINIC | Age: 29
End: 2024-09-18
Payer: MEDICAID

## 2024-09-18 ENCOUNTER — ANCILLARY PROCEDURE (OUTPATIENT)
Dept: OBGYN CLINIC | Age: 29
End: 2024-09-18
Payer: MEDICAID

## 2024-09-18 VITALS
SYSTOLIC BLOOD PRESSURE: 116 MMHG | DIASTOLIC BLOOD PRESSURE: 67 MMHG | WEIGHT: 251 LBS | BODY MASS INDEX: 36.01 KG/M2 | HEART RATE: 92 BPM

## 2024-09-18 DIAGNOSIS — O26.23 HABITUAL ABORTER, ANTEPARTUM, THIRD TRIMESTER: Primary | ICD-10-CM

## 2024-09-18 DIAGNOSIS — O36.5930 POOR FETAL GROWTH AFFECTING MANAGEMENT OF MOTHER IN THIRD TRIMESTER, SINGLE OR UNSPECIFIED FETUS: ICD-10-CM

## 2024-09-18 PROBLEM — Z3A.32 32 WEEKS GESTATION OF PREGNANCY: Status: ACTIVE | Noted: 2024-08-28

## 2024-09-18 LAB
GLUCOSE URINE, POC: NORMAL MG/DL
PROTEIN UA: NEGATIVE

## 2024-09-18 PROCEDURE — 99213 OFFICE O/P EST LOW 20 MIN: CPT | Performed by: OBSTETRICS & GYNECOLOGY

## 2024-09-18 PROCEDURE — 99999 PR OFFICE/OUTPT VISIT,PROCEDURE ONLY: CPT | Performed by: OBSTETRICS & GYNECOLOGY

## 2024-09-18 PROCEDURE — 76820 UMBILICAL ARTERY ECHO: CPT | Performed by: OBSTETRICS & GYNECOLOGY

## 2024-09-18 PROCEDURE — 81002 URINALYSIS NONAUTO W/O SCOPE: CPT | Performed by: OBSTETRICS & GYNECOLOGY

## 2024-09-18 PROCEDURE — 76818 FETAL BIOPHYS PROFILE W/NST: CPT | Performed by: OBSTETRICS & GYNECOLOGY

## 2024-09-24 ENCOUNTER — ANCILLARY PROCEDURE (OUTPATIENT)
Dept: OBGYN CLINIC | Age: 29
End: 2024-09-24
Payer: MEDICAID

## 2024-09-24 ENCOUNTER — ROUTINE PRENATAL (OUTPATIENT)
Dept: OBGYN CLINIC | Age: 29
End: 2024-09-24
Payer: MEDICAID

## 2024-09-24 VITALS
SYSTOLIC BLOOD PRESSURE: 127 MMHG | BODY MASS INDEX: 36.3 KG/M2 | HEART RATE: 98 BPM | WEIGHT: 253 LBS | DIASTOLIC BLOOD PRESSURE: 71 MMHG

## 2024-09-24 DIAGNOSIS — D56.3 ALPHA THALASSEMIA SILENT CARRIER: ICD-10-CM

## 2024-09-24 DIAGNOSIS — O36.5930 POOR FETAL GROWTH AFFECTING MANAGEMENT OF MOTHER IN THIRD TRIMESTER, SINGLE OR UNSPECIFIED FETUS: Primary | ICD-10-CM

## 2024-09-24 DIAGNOSIS — N96 HABITUAL ABORTER: ICD-10-CM

## 2024-09-24 DIAGNOSIS — Z98.890 HISTORY OF LOOP ELECTROSURGICAL EXCISION PROCEDURE (LEEP) OF CERVIX AFFECTING PREGNANCY IN SECOND TRIMESTER: ICD-10-CM

## 2024-09-24 DIAGNOSIS — O34.42 HISTORY OF LOOP ELECTROSURGICAL EXCISION PROCEDURE (LEEP) OF CERVIX AFFECTING PREGNANCY IN SECOND TRIMESTER: ICD-10-CM

## 2024-09-24 DIAGNOSIS — O99.210 OBESITY AFFECTING PREGNANCY, ANTEPARTUM, UNSPECIFIED OBESITY TYPE: ICD-10-CM

## 2024-09-24 DIAGNOSIS — O35.2XX0 HEREDITARY DISEASE IN FAMILY POSSIBLY AFFECTING FETUS, AFFECTING MANAGEMENT OF MOTHER IN PREGNANCY, SINGLE OR UNSPECIFIED FETUS: ICD-10-CM

## 2024-09-24 DIAGNOSIS — O36.5920 POOR FETAL GROWTH AFFECTING MANAGEMENT OF MOTHER IN SECOND TRIMESTER, SINGLE OR UNSPECIFIED FETUS: ICD-10-CM

## 2024-09-24 DIAGNOSIS — O36.5930 POOR FETAL GROWTH AFFECTING MANAGEMENT OF MOTHER IN SINGLETON PREGNANCY IN THIRD TRIMESTER: ICD-10-CM

## 2024-09-24 DIAGNOSIS — O26.23 HABITUAL ABORTER, ANTEPARTUM, THIRD TRIMESTER: ICD-10-CM

## 2024-09-24 LAB
GLUCOSE URINE, POC: NORMAL MG/DL
PROTEIN UA: NEGATIVE

## 2024-09-24 PROCEDURE — 76820 UMBILICAL ARTERY ECHO: CPT | Performed by: OBSTETRICS & GYNECOLOGY

## 2024-09-24 PROCEDURE — 99213 OFFICE O/P EST LOW 20 MIN: CPT | Performed by: OBSTETRICS & GYNECOLOGY

## 2024-09-24 PROCEDURE — 76818 FETAL BIOPHYS PROFILE W/NST: CPT | Performed by: OBSTETRICS & GYNECOLOGY

## 2024-09-24 PROCEDURE — 81002 URINALYSIS NONAUTO W/O SCOPE: CPT | Performed by: OBSTETRICS & GYNECOLOGY

## 2024-09-24 PROCEDURE — 76816 OB US FOLLOW-UP PER FETUS: CPT | Performed by: OBSTETRICS & GYNECOLOGY

## 2024-09-24 PROCEDURE — 76821 MIDDLE CEREBRAL ARTERY ECHO: CPT | Performed by: OBSTETRICS & GYNECOLOGY

## 2024-09-27 PROBLEM — O36.5930 POOR FETAL GROWTH AFFECTING MANAGEMENT OF MOTHER IN SINGLETON PREGNANCY IN THIRD TRIMESTER: Status: ACTIVE | Noted: 2024-09-27

## 2024-10-01 ENCOUNTER — ANCILLARY PROCEDURE (OUTPATIENT)
Dept: OBGYN CLINIC | Age: 29
End: 2024-10-01
Payer: MEDICAID

## 2024-10-01 ENCOUNTER — ROUTINE PRENATAL (OUTPATIENT)
Dept: OBGYN CLINIC | Age: 29
End: 2024-10-01
Payer: MEDICAID

## 2024-10-01 VITALS
HEART RATE: 73 BPM | SYSTOLIC BLOOD PRESSURE: 118 MMHG | DIASTOLIC BLOOD PRESSURE: 67 MMHG | WEIGHT: 255 LBS | BODY MASS INDEX: 36.59 KG/M2

## 2024-10-01 DIAGNOSIS — O35.2XX0 HEREDITARY DISEASE IN FAMILY POSSIBLY AFFECTING FETUS, AFFECTING MANAGEMENT OF MOTHER IN PREGNANCY, SINGLE OR UNSPECIFIED FETUS: ICD-10-CM

## 2024-10-01 DIAGNOSIS — Z3A.34 34 WEEKS GESTATION OF PREGNANCY: ICD-10-CM

## 2024-10-01 DIAGNOSIS — O36.5930 POOR FETAL GROWTH AFFECTING MANAGEMENT OF MOTHER IN THIRD TRIMESTER, SINGLE OR UNSPECIFIED FETUS: ICD-10-CM

## 2024-10-01 DIAGNOSIS — O26.23 HABITUAL ABORTER, ANTEPARTUM, THIRD TRIMESTER: Primary | ICD-10-CM

## 2024-10-01 LAB
GLUCOSE URINE, POC: NORMAL MG/DL
PROTEIN UA: POSITIVE

## 2024-10-01 PROCEDURE — 81002 URINALYSIS NONAUTO W/O SCOPE: CPT | Performed by: OBSTETRICS & GYNECOLOGY

## 2024-10-01 PROCEDURE — 76818 FETAL BIOPHYS PROFILE W/NST: CPT | Performed by: OBSTETRICS & GYNECOLOGY

## 2024-10-01 PROCEDURE — 99999 PR OFFICE/OUTPT VISIT,PROCEDURE ONLY: CPT | Performed by: OBSTETRICS & GYNECOLOGY

## 2024-10-01 PROCEDURE — 76820 UMBILICAL ARTERY ECHO: CPT | Performed by: OBSTETRICS & GYNECOLOGY

## 2024-10-01 PROCEDURE — 99213 OFFICE O/P EST LOW 20 MIN: CPT | Performed by: OBSTETRICS & GYNECOLOGY

## 2024-10-01 NOTE — PROGRESS NOTES
Patient is here today for bpp/nst. Patient states she has pain and cramping in feet sometimes. Having swelling. Abdominal cramping and pain.  Denies any vaginal bleeding. Patient reports good fetal movement.

## 2024-10-01 NOTE — PROGRESS NOTES
[place] [facility]   [facility] [place] [facility] [place] [facility]  [address] [facility] [facility] [place] [address]  Dept: [phone]  Loc: [phone]     10/1/2024    RE:  Manasa Spann     : 1995   AGE: 29 y.o.     Dear Dr. Ferguson,    Thank you for allowing me to see Manasa Spann.  As I'm sure you will recall, Manasa Spann is a 29 y.o. [id number] LMP recorded. Patient is pregnant. Estimated Date of Delivery: 24 at 34w5d seen in our office today for the following:    REASON FOR VISIT: BPP and Dopplers    Patient Active Problem List    Diagnosis Date Noted    34 weeks gestation of pregnancy 10/01/2024    Poor fetal growth affecting management of mother in sexton pregnancy in third trimester 2024    Short cervix during pregnancy in third trimester 2024    Habitual aborter, antepartum, third trimester 2024    Maternal obesity affecting pregnancy, antepartum 2024    Hereditary disease in family possibly affecting fetus 2024    Fifth pregnancy 2024    History of loop electrosurgical excision procedure (LEEP) of cervix affecting pregnancy in second trimester 2024    Alpha thalassemia silent carrier 2024        PAST HISTORY:  OB History    Para Term  AB Living   5       4     SAB IAB Ectopic Molar Multiple Live Births   4                # Outcome Date GA Lbr Oscar/2nd Weight Sex Type Anes PTL Lv   5 Current            4 SAB            3 SAB            2 SAB            1 SAB                   MEDICAL:  Past Medical History:   Diagnosis Date    Abnormal Pap smear of cervix     Anemia     Asthma     Depression         SURGICAL:  Past Surgical History:   Procedure Laterality Date    ANKLE SURGERY      LEEP         ALLERGIES:    No Known Allergies      MEDICATIONS:    Current Outpatient Medications   Medication Sig Dispense Refill    pantoprazole (PROTONIX) 20 MG tablet       folic acid (FOLVITE) 1 MG

## 2024-10-08 ENCOUNTER — ROUTINE PRENATAL (OUTPATIENT)
Dept: OBGYN CLINIC | Age: 29
End: 2024-10-08
Payer: MEDICAID

## 2024-10-08 ENCOUNTER — ANCILLARY PROCEDURE (OUTPATIENT)
Dept: OBGYN CLINIC | Age: 29
End: 2024-10-08
Payer: MEDICAID

## 2024-10-08 VITALS
HEART RATE: 91 BPM | DIASTOLIC BLOOD PRESSURE: 69 MMHG | WEIGHT: 250 LBS | SYSTOLIC BLOOD PRESSURE: 117 MMHG | BODY MASS INDEX: 35.87 KG/M2

## 2024-10-08 DIAGNOSIS — O34.42 HISTORY OF LOOP ELECTROSURGICAL EXCISION PROCEDURE (LEEP) OF CERVIX AFFECTING PREGNANCY IN SECOND TRIMESTER: ICD-10-CM

## 2024-10-08 DIAGNOSIS — O99.210 OBESITY AFFECTING PREGNANCY, ANTEPARTUM, UNSPECIFIED OBESITY TYPE: ICD-10-CM

## 2024-10-08 DIAGNOSIS — O26.23 HABITUAL ABORTER, ANTEPARTUM, THIRD TRIMESTER: ICD-10-CM

## 2024-10-08 DIAGNOSIS — O26.873 SHORT CERVIX DURING PREGNANCY IN THIRD TRIMESTER: ICD-10-CM

## 2024-10-08 DIAGNOSIS — Z98.890 HISTORY OF LOOP ELECTROSURGICAL EXCISION PROCEDURE (LEEP) OF CERVIX AFFECTING PREGNANCY IN SECOND TRIMESTER: ICD-10-CM

## 2024-10-08 DIAGNOSIS — O36.5930 POOR FETAL GROWTH AFFECTING MANAGEMENT OF MOTHER IN THIRD TRIMESTER, SINGLE OR UNSPECIFIED FETUS: Primary | ICD-10-CM

## 2024-10-08 DIAGNOSIS — N96 HABITUAL ABORTER: ICD-10-CM

## 2024-10-08 DIAGNOSIS — O36.5930 POOR FETAL GROWTH AFFECTING MANAGEMENT OF MOTHER IN SINGLETON PREGNANCY IN THIRD TRIMESTER: ICD-10-CM

## 2024-10-08 DIAGNOSIS — O35.2XX0 HEREDITARY DISEASE IN FAMILY POSSIBLY AFFECTING FETUS, AFFECTING MANAGEMENT OF MOTHER IN PREGNANCY, SINGLE OR UNSPECIFIED FETUS: ICD-10-CM

## 2024-10-08 DIAGNOSIS — D56.3 ALPHA THALASSEMIA SILENT CARRIER: ICD-10-CM

## 2024-10-08 LAB
GLUCOSE URINE, POC: NORMAL MG/DL
PROTEIN UA: NEGATIVE

## 2024-10-08 PROCEDURE — 99213 OFFICE O/P EST LOW 20 MIN: CPT | Performed by: OBSTETRICS & GYNECOLOGY

## 2024-10-08 PROCEDURE — 76820 UMBILICAL ARTERY ECHO: CPT | Performed by: OBSTETRICS & GYNECOLOGY

## 2024-10-08 PROCEDURE — 76821 MIDDLE CEREBRAL ARTERY ECHO: CPT | Performed by: OBSTETRICS & GYNECOLOGY

## 2024-10-08 PROCEDURE — 81002 URINALYSIS NONAUTO W/O SCOPE: CPT | Performed by: OBSTETRICS & GYNECOLOGY

## 2024-10-08 PROCEDURE — 76816 OB US FOLLOW-UP PER FETUS: CPT | Performed by: OBSTETRICS & GYNECOLOGY

## 2024-10-08 NOTE — PROGRESS NOTES
Patient is here today for bpp/nst. Denies any vaginal bleeding,  or leakage of fluids.  Cramping but not as bad as before.  Patient reports good fetal movement.

## 2024-10-09 NOTE — PROGRESS NOTES
10/8/2024    Dony Ferguson MD  [address] [place] [address]     RE:  MANASA SPANN  : 1995   AGE: 29 y.o.    This report has been created using voice recognition software. It may contain errors which are inherent in voice recognition technology.    Dear Dr. Ferguson:    Manasa Spann had a fetal ultrasound evaluation and fetal testing performed today for the following indications:    Patient Active Problem List   Diagnosis    History of loop electrosurgical excision procedure (LEEP) of cervix affecting pregnancy in second trimester    Alpha thalassemia silent carrier    Poor fetal growth affecting management of mother in third trimester    Maternal obesity affecting pregnancy, antepartum    Hereditary disease in family possibly affecting fetus    Habitual aborter, antepartum, third trimester     Manasa Spann is a 29 y.o. female, who is G5(0,0,4,0). She has an Estimated Date of Delivery: 2024 based on her established dates.  She is currently 35 weeks 5 days gestation based on that assessment.     The patient has had no vaginal bleeding or leaking of amniotic fluid.  She had a fetal ultrasound evaluation performed on 2024 which demonstrated poor fetal growth.  The BPD was less than the first growth percentile.  The head circumference was at the 8th growth percentile.  The abdominal circumference was at the 5th growth percentile.  The overall weight was 524 grams consistent with the 29th growth percentile.    The patient was previously hospitalized for management secondary to a short cervical length with uterine contractions.    The results of Panorama screen were negative. I previously advised her that this a screening test not a diagnostic test. I advised her that the test screens only for trisomy 21, 18 and 13. We discussed the sensitivity of the test which I advised the patient is as follows:      99.99% for detection of trisomy 21 with a specificity of 99.99%

## 2024-10-16 ENCOUNTER — ANCILLARY PROCEDURE (OUTPATIENT)
Dept: OBGYN CLINIC | Age: 29
End: 2024-10-16
Payer: MEDICAID

## 2024-10-16 ENCOUNTER — ROUTINE PRENATAL (OUTPATIENT)
Dept: OBGYN CLINIC | Age: 29
End: 2024-10-16
Payer: MEDICAID

## 2024-10-16 VITALS
BODY MASS INDEX: 37.02 KG/M2 | SYSTOLIC BLOOD PRESSURE: 120 MMHG | DIASTOLIC BLOOD PRESSURE: 74 MMHG | WEIGHT: 258 LBS | HEART RATE: 89 BPM

## 2024-10-16 DIAGNOSIS — O36.5930 POOR FETAL GROWTH AFFECTING MANAGEMENT OF MOTHER IN SINGLETON PREGNANCY IN THIRD TRIMESTER: Primary | ICD-10-CM

## 2024-10-16 DIAGNOSIS — Z3A.36 36 WEEKS GESTATION OF PREGNANCY: ICD-10-CM

## 2024-10-16 DIAGNOSIS — O36.5930 POOR FETAL GROWTH AFFECTING MANAGEMENT OF MOTHER IN THIRD TRIMESTER, SINGLE OR UNSPECIFIED FETUS: ICD-10-CM

## 2024-10-16 PROCEDURE — 76820 UMBILICAL ARTERY ECHO: CPT | Performed by: OBSTETRICS & GYNECOLOGY

## 2024-10-16 PROCEDURE — 76818 FETAL BIOPHYS PROFILE W/NST: CPT | Performed by: OBSTETRICS & GYNECOLOGY

## 2024-10-16 PROCEDURE — 99213 OFFICE O/P EST LOW 20 MIN: CPT | Performed by: OBSTETRICS & GYNECOLOGY

## 2024-10-16 PROCEDURE — 99999 PR OFFICE/OUTPT VISIT,PROCEDURE ONLY: CPT | Performed by: OBSTETRICS & GYNECOLOGY

## 2024-10-16 NOTE — PROGRESS NOTES
Patient is here today for bpp/nst. Pelvic pressure, feels like baby is reaching.   Denies any vaginal bleeding.  Patient reports good fetal movement.

## 2024-10-16 NOTE — PROGRESS NOTES
[facility]   [facility] PHYSICIANS [place] SPECIALTY [facility]  [address] [place] [address]  Dept: [phone]  Loc: [phone]     10/16/2024    RE:  Manasa Spann     : 1995   AGE: 29 y.o.     Dear Dr. Ferguson,    Thank you for allowing me to see Manasa Spann.  As I'm sure you will recall, Manasa Spann is a 29 y.o. [id number] LMP recorded. Patient is pregnant. Estimated Date of Delivery: 24 at 36w6d seen in our office today for the following:    REASON FOR VISIT: BPP and Dopplers    Patient Active Problem List    Diagnosis Date Noted    36 weeks gestation of pregnancy 10/16/2024    Poor fetal growth affecting management of mother in sexton pregnancy in third trimester 2024    Short cervix during pregnancy in third trimester 2024    Habitual aborter, antepartum, third trimester 2024    Maternal obesity affecting pregnancy, antepartum 2024    Hereditary disease in family possibly affecting fetus 2024    Fifth pregnancy 2024    History of loop electrosurgical excision procedure (LEEP) of cervix affecting pregnancy in second trimester 2024    Alpha thalassemia silent carrier 2024        PAST HISTORY:  OB History    Para Term  AB Living   5       4     SAB IAB Ectopic Molar Multiple Live Births   4                # Outcome Date GA Lbr Oscar/2nd Weight Sex Type Anes PTL Lv   5 Current            4 SAB            3 SAB            2 SAB            1 SAB                   MEDICAL:  Past Medical History:   Diagnosis Date    Abnormal Pap smear of cervix     Anemia     Asthma     Depression         SURGICAL:  Past Surgical History:   Procedure Laterality Date    ANKLE SURGERY      LEEP         ALLERGIES:    No Known Allergies      MEDICATIONS:    Current Outpatient Medications   Medication Sig Dispense Refill    pantoprazole (PROTONIX) 20 MG tablet       folic acid (FOLVITE) 1 MG

## 2024-10-23 ENCOUNTER — ANCILLARY PROCEDURE (OUTPATIENT)
Dept: OBGYN CLINIC | Age: 29
End: 2024-10-23
Payer: MEDICAID

## 2024-10-23 ENCOUNTER — ROUTINE PRENATAL (OUTPATIENT)
Dept: OBGYN CLINIC | Age: 29
End: 2024-10-23
Payer: MEDICAID

## 2024-10-23 VITALS
DIASTOLIC BLOOD PRESSURE: 75 MMHG | WEIGHT: 254 LBS | SYSTOLIC BLOOD PRESSURE: 119 MMHG | BODY MASS INDEX: 36.45 KG/M2 | HEART RATE: 87 BPM

## 2024-10-23 DIAGNOSIS — O36.5930 POOR FETAL GROWTH AFFECTING MANAGEMENT OF MOTHER IN THIRD TRIMESTER, SINGLE OR UNSPECIFIED FETUS: ICD-10-CM

## 2024-10-23 DIAGNOSIS — Z36.9 ENCOUNTER FOR FETAL ULTRASOUND: Primary | ICD-10-CM

## 2024-10-23 DIAGNOSIS — N96 HABITUAL ABORTER: ICD-10-CM

## 2024-10-23 PROBLEM — Z3A.37 37 WEEKS GESTATION OF PREGNANCY: Status: ACTIVE | Noted: 2024-10-16

## 2024-10-23 PROCEDURE — 76818 FETAL BIOPHYS PROFILE W/NST: CPT | Performed by: OBSTETRICS & GYNECOLOGY

## 2024-10-23 PROCEDURE — 76820 UMBILICAL ARTERY ECHO: CPT | Performed by: OBSTETRICS & GYNECOLOGY

## 2024-10-23 PROCEDURE — 81002 URINALYSIS NONAUTO W/O SCOPE: CPT | Performed by: OBSTETRICS & GYNECOLOGY

## 2024-10-23 PROCEDURE — 99999 PR OFFICE/OUTPT VISIT,PROCEDURE ONLY: CPT | Performed by: OBSTETRICS & GYNECOLOGY

## 2024-10-23 PROCEDURE — 99213 OFFICE O/P EST LOW 20 MIN: CPT | Performed by: OBSTETRICS & GYNECOLOGY

## 2024-10-23 NOTE — PROGRESS NOTES
[facility]   [facility] PHYSICIANS [place] SPECIALTY [facility]  [address] [place] [address]  Dept: [phone]  Loc: [phone]     10/23/2024    RE:  Manasa Spann     : 1995   AGE: 29 y.o.     Dear Dr. Ferguson,    Thank you for allowing me to see Manasa Spann.  As I'm sure you will recall, Manasa Spann is a 29 y.o. [id number] LMP recorded. Patient is pregnant. Estimated Date of Delivery: 24 at 37w6d seen in our office today for the following:    REASON FOR VISIT: BPP and Dopplers    Patient Active Problem List    Diagnosis Date Noted    37 weeks gestation of pregnancy 10/16/2024    Poor fetal growth affecting management of mother in sexton pregnancy in third trimester 2024    Short cervix during pregnancy in third trimester 2024    Habitual aborter, antepartum, third trimester 2024    Maternal obesity affecting pregnancy, antepartum 2024    Hereditary disease in family possibly affecting fetus 2024    Fifth pregnancy 2024    History of loop electrosurgical excision procedure (LEEP) of cervix affecting pregnancy in second trimester 2024    Alpha thalassemia silent carrier 2024        PAST HISTORY:  OB History    Para Term  AB Living   5       4     SAB IAB Ectopic Molar Multiple Live Births   4                # Outcome Date GA Lbr Oscar/2nd Weight Sex Type Anes PTL Lv   5 Current            4 SAB            3 SAB            2 SAB            1 SAB                   MEDICAL:  Past Medical History:   Diagnosis Date    Abnormal Pap smear of cervix     Anemia     Asthma     Depression         SURGICAL:  Past Surgical History:   Procedure Laterality Date    ANKLE SURGERY      LEEP         ALLERGIES:    No Known Allergies      MEDICATIONS:    Current Outpatient Medications   Medication Sig Dispense Refill    pantoprazole (PROTONIX) 20 MG tablet       folic acid (FOLVITE) 1 MG

## 2024-10-23 NOTE — PROGRESS NOTES
Patient is here today for 1 wk f/u. Denies any vaginal bleeding,  or leakage of fluids.  Cramping on and off.  Patient reports good fetal movement.

## 2024-10-30 ENCOUNTER — ANESTHESIA EVENT (OUTPATIENT)
Dept: MOTHER INFANT UNIT | Age: 29
DRG: 560 | End: 2024-10-30
Payer: MEDICAID

## 2024-10-30 ENCOUNTER — HOSPITAL ENCOUNTER (INPATIENT)
Age: 29
LOS: 5 days | Discharge: HOME | DRG: 560 | End: 2024-11-04
Attending: OBSTETRICS & GYNECOLOGY | Admitting: OBSTETRICS & GYNECOLOGY
Payer: MEDICAID

## 2024-10-30 ENCOUNTER — APPOINTMENT (OUTPATIENT)
Dept: LABOR AND DELIVERY | Age: 29
DRG: 560 | End: 2024-10-30
Payer: MEDICAID

## 2024-10-30 ENCOUNTER — ANESTHESIA (OUTPATIENT)
Dept: MOTHER INFANT UNIT | Age: 29
DRG: 560 | End: 2024-10-30
Payer: MEDICAID

## 2024-10-30 DIAGNOSIS — G89.18 PAIN FOLLOWING SURGERY OR PROCEDURE: Primary | ICD-10-CM

## 2024-10-30 PROBLEM — Z98.890 HISTORY OF LEEP (LOOP ELECTROSURGICAL EXCISION PROCEDURE) OF CERVIX COMPLICATING PREGNANCY IN THIRD TRIMESTER: Status: ACTIVE | Noted: 2024-10-30

## 2024-10-30 PROBLEM — O34.43 HISTORY OF LEEP (LOOP ELECTROSURGICAL EXCISION PROCEDURE) OF CERVIX COMPLICATING PREGNANCY IN THIRD TRIMESTER: Status: ACTIVE | Noted: 2024-10-30

## 2024-10-30 PROBLEM — Z3A.38 38 WEEKS GESTATION OF PREGNANCY: Status: ACTIVE | Noted: 2024-10-30

## 2024-10-30 LAB
ABO + RH BLD: NORMAL
AMPHET UR QL SCN: NEGATIVE
ARM BAND NUMBER: NORMAL
BARBITURATES UR QL SCN: NEGATIVE
BENZODIAZ UR QL: NEGATIVE
BLOOD BANK SAMPLE EXPIRATION: NORMAL
BLOOD GROUP ANTIBODIES SERPL: NEGATIVE
BUPRENORPHINE UR QL: NEGATIVE
CANNABINOIDS UR QL SCN: POSITIVE
COCAINE UR QL SCN: NEGATIVE
ERYTHROCYTE [DISTWIDTH] IN BLOOD BY AUTOMATED COUNT: 17.4 % (ref 11.5–15)
FENTANYL UR QL: NEGATIVE
HCT VFR BLD AUTO: 29.9 % (ref 34–48)
HGB BLD-MCNC: 8.8 G/DL (ref 11.5–15.5)
MCH RBC QN AUTO: 22.3 PG (ref 26–35)
MCHC RBC AUTO-ENTMCNC: 29.4 G/DL (ref 32–34.5)
MCV RBC AUTO: 75.7 FL (ref 80–99.9)
METHADONE UR QL: NEGATIVE
OPIATES UR QL SCN: NEGATIVE
OXYCODONE UR QL SCN: NEGATIVE
PCP UR QL SCN: NEGATIVE
PLATELET, FLUORESCENCE: 213 K/UL (ref 130–450)
PMV BLD AUTO: 13 FL (ref 7–12)
RBC # BLD AUTO: 3.95 M/UL (ref 3.5–5.5)
TEST INFORMATION: ABNORMAL
WBC OTHER # BLD: 9.1 K/UL (ref 4.5–11.5)

## 2024-10-30 PROCEDURE — APPNB30 APP NON BILLABLE TIME 0-30 MINS: Performed by: PHYSICIAN ASSISTANT

## 2024-10-30 PROCEDURE — 6370000000 HC RX 637 (ALT 250 FOR IP): Performed by: OBSTETRICS & GYNECOLOGY

## 2024-10-30 PROCEDURE — 86850 RBC ANTIBODY SCREEN: CPT

## 2024-10-30 PROCEDURE — 6370000000 HC RX 637 (ALT 250 FOR IP)

## 2024-10-30 PROCEDURE — 86900 BLOOD TYPING SEROLOGIC ABO: CPT

## 2024-10-30 PROCEDURE — G0480 DRUG TEST DEF 1-7 CLASSES: HCPCS

## 2024-10-30 PROCEDURE — 85027 COMPLETE CBC AUTOMATED: CPT

## 2024-10-30 PROCEDURE — 86592 SYPHILIS TEST NON-TREP QUAL: CPT

## 2024-10-30 PROCEDURE — 1220000001 HC SEMI PRIVATE L&D R&B

## 2024-10-30 PROCEDURE — 86901 BLOOD TYPING SEROLOGIC RH(D): CPT

## 2024-10-30 PROCEDURE — 80307 DRUG TEST PRSMV CHEM ANLYZR: CPT

## 2024-10-30 PROCEDURE — 99221 1ST HOSP IP/OBS SF/LOW 40: CPT | Performed by: PHYSICIAN ASSISTANT

## 2024-10-30 RX ORDER — ONDANSETRON 4 MG/1
4 TABLET, ORALLY DISINTEGRATING ORAL EVERY 6 HOURS PRN
OUTPATIENT
Start: 2024-10-30

## 2024-10-30 RX ORDER — SODIUM CHLORIDE, SODIUM LACTATE, POTASSIUM CHLORIDE, AND CALCIUM CHLORIDE .6; .31; .03; .02 G/100ML; G/100ML; G/100ML; G/100ML
500 INJECTION, SOLUTION INTRAVENOUS PRN
Status: DISCONTINUED | OUTPATIENT
Start: 2024-10-30 | End: 2024-11-01

## 2024-10-30 RX ORDER — SODIUM CHLORIDE 0.9 % (FLUSH) 0.9 %
5-40 SYRINGE (ML) INJECTION PRN
Status: DISCONTINUED | OUTPATIENT
Start: 2024-10-30 | End: 2024-11-01

## 2024-10-30 RX ORDER — LIDOCAINE HYDROCHLORIDE 10 MG/ML
INJECTION, SOLUTION INFILTRATION; PERINEURAL
Status: DISPENSED
Start: 2024-10-30 | End: 2024-10-30

## 2024-10-30 RX ORDER — SODIUM CHLORIDE 9 MG/ML
25 INJECTION, SOLUTION INTRAVENOUS PRN
Status: DISCONTINUED | OUTPATIENT
Start: 2024-10-30 | End: 2024-11-01

## 2024-10-30 RX ORDER — MISOPROSTOL 200 UG/1
400 TABLET ORAL PRN
OUTPATIENT
Start: 2024-10-30

## 2024-10-30 RX ORDER — CARBOPROST TROMETHAMINE 250 UG/ML
250 INJECTION, SOLUTION INTRAMUSCULAR PRN
OUTPATIENT
Start: 2024-10-30

## 2024-10-30 RX ORDER — METHYLERGONOVINE MALEATE 0.2 MG/ML
200 INJECTION INTRAVENOUS PRN
OUTPATIENT
Start: 2024-10-30

## 2024-10-30 RX ORDER — SODIUM CHLORIDE, SODIUM LACTATE, POTASSIUM CHLORIDE, CALCIUM CHLORIDE 600; 310; 30; 20 MG/100ML; MG/100ML; MG/100ML; MG/100ML
INJECTION, SOLUTION INTRAVENOUS CONTINUOUS
OUTPATIENT
Start: 2024-10-30

## 2024-10-30 RX ORDER — OXYTOCIN/0.9 % SODIUM CHLORIDE 30/500 ML
87.3 PLASTIC BAG, INJECTION (ML) INTRAVENOUS CONTINUOUS PRN
OUTPATIENT
Start: 2024-10-30 | End: 2024-11-01

## 2024-10-30 RX ORDER — TERBUTALINE SULFATE 1 MG/ML
0.25 INJECTION, SOLUTION SUBCUTANEOUS
Status: ACTIVE | OUTPATIENT
Start: 2024-10-30 | End: 2024-10-31

## 2024-10-30 RX ORDER — TRANEXAMIC ACID 10 MG/ML
1000 INJECTION, SOLUTION INTRAVENOUS
OUTPATIENT
Start: 2024-10-30 | End: 2024-10-31

## 2024-10-30 RX ORDER — ONDANSETRON 2 MG/ML
4 INJECTION INTRAMUSCULAR; INTRAVENOUS EVERY 6 HOURS PRN
OUTPATIENT
Start: 2024-10-30

## 2024-10-30 RX ORDER — CALCIUM CARBONATE 500 MG/1
TABLET, CHEWABLE ORAL
Status: COMPLETED
Start: 2024-10-30 | End: 2024-10-30

## 2024-10-30 RX ORDER — ACETAMINOPHEN 650 MG
TABLET, EXTENDED RELEASE ORAL
Status: DISPENSED
Start: 2024-10-30 | End: 2024-10-30

## 2024-10-30 RX ORDER — SODIUM CHLORIDE 0.9 % (FLUSH) 0.9 %
5-40 SYRINGE (ML) INJECTION EVERY 12 HOURS SCHEDULED
Status: DISCONTINUED | OUTPATIENT
Start: 2024-10-30 | End: 2024-11-01

## 2024-10-30 RX ADMIN — Medication 25 MCG: at 14:44

## 2024-10-30 RX ADMIN — CALCIUM CARBONATE 1000 MG: 500 TABLET, CHEWABLE ORAL at 20:11

## 2024-10-30 RX ADMIN — Medication 25 MCG: at 10:36

## 2024-10-30 RX ADMIN — Medication 25 MCG: at 18:53

## 2024-10-30 NOTE — PROGRESS NOTES
Orders received from Dr Ferguson for Cytotec IOL. Patient may have IV Stadol 2mg Q3PRN.   Verbal confirmation received that patient's GBS is negative. Will obtain records from [facility].

## 2024-10-30 NOTE — H&P
Department of Obstetrics and Gynecology  Physician Assistant Obstetrics History and Physical      HISTORY OF PRESENT ILLNESS:      The patient is a 29 y.o.  5 parity 0 at 38 weeks' 6 days' gestation presents to L&D from home for elective IOL per MFM recommendtion secondary to IUGR.    Current obstetric history is significant for:  IUGR  Short cervix  Habitual aborter  Previous LEEP    Estimated Due Date:  2024  Contractions: No  Leaking of fluid: No  Bleeding:  No  Perceived fetal movement: Good        PAST OB HISTORY:  OB History    Para Term  AB Living   5       4     SAB IAB Ectopic Molar Multiple Live Births   4                # Outcome Date GA Lbr Oscar/2nd Weight Sex Type Anes PTL Lv   5 Current            4 SAB            3 SAB            2 SAB            1 SAB                    Pre-eclampsia:  No      D & C:  Yes x 1      Cerclage:  No      LEEP:  No      Myomectomy:  No       Labor: No    Past Medical History:    Diagnosis Date    Abnormal Pap smear of cervix     Anemia     Asthma     Depression         Past Surgical History:    Procedure Laterality Date    ANKLE SURGERY      LEEP          Social History:    Reports that she has quit smoking. Her smoking use included cigarettes. She has never used smokeless tobacco. She reports that she does not currently use drugs. She reports that she does not drink alcohol.     Family History   Problem Relation Age of Onset    Prostate Cancer Father     Anemia Mother        Blood type: A positive  Antibody screen: Negative    Rubella: Immune  RPR: Non-reactive  Hepatitis B Surface Antigen: Non-reactive  HIV: Non-reactive  Gonorrhea: Negative  Chlamydia: Negative  Group B Strep: Negative per Dr. Ferguson    Medications Prior to Admission:  Medications Prior to Admission: pantoprazole (PROTONIX) 20 MG tablet,   folic acid (FOLVITE) 1 MG tablet, Take 1 tablet by mouth daily  ondansetron (ZOFRAN) 4 MG tablet, Take 1 tablet by mouth in the

## 2024-10-30 NOTE — ANESTHESIA PRE PROCEDURE
Department of Anesthesiology  Preprocedure Note       Name:  Manasa Spann   Age:  29 y.o.  :  1995                                          MRN:  [id number]         Date:  10/30/2024      Surgeon: * No surgeons listed *    Procedure: * No procedures listed *    Medications prior to admission:   Prior to Admission medications    Medication Sig Start Date End Date Taking? Authorizing Provider   pantoprazole (PROTONIX) 20 MG tablet  24  Yes Marko Rodrigez MD   folic acid (FOLVITE) 1 MG tablet Take 1 tablet by mouth daily 24  Yes Marko Rodrigez MD   ondansetron (ZOFRAN) 4 MG tablet Take 1 tablet by mouth in the morning, at noon, in the evening, and at bedtime 24  Yes Marko Rodrigez MD   ASPIRIN LOW DOSE 81 MG EC tablet Take 1 tablet by mouth daily 24  Yes Marko Rodrigez MD   Prenatal-FeFum-FA-DHA w/o A (PRENATAL + DHA) 27-1 & 250 MG THPK Take by mouth   Yes Marko Rodrigez MD   albuterol sulfate  (90 Base) MCG/ACT inhaler Inhale 2 puffs into the lungs every 6 hours as needed for Wheezing 3/27/19  Yes Nancy Patel PA   diphenhydrAMINE (BENADRYL) 25 MG tablet Take 1 tablet by mouth every 6 hours as needed for Itching  Patient not taking: Reported on 10/30/2024    Marko Rodrigez MD       Current medications:    Current Facility-Administered Medications   Medication Dose Route Frequency Provider Last Rate Last Admin    lactated ringers bolus 500 mL  500 mL IntraVENous PRN Dony Ferguson MD        Or    lactated ringers bolus 500 mL  500 mL IntraVENous PRN Dony Ferguson MD        sodium chloride flush 0.9 % injection 5-40 mL  5-40 mL IntraVENous 2 times per day Dony Ferguson MD        sodium chloride flush 0.9 % injection 5-40 mL  5-40 mL IntraVENous PRN Dony Ferguson MD        0.9 % sodium chloride infusion  25 mL IntraVENous PRN Dony Ferguson MD        terbutaline (BRETHINE) injection 0.25

## 2024-10-30 NOTE — PLAN OF CARE
Problem: Pain  Goal: Verbalizes/displays adequate comfort level or baseline comfort level  Outcome: Progressing     Problem: Vaginal Birth or  Section  Goal: Fetal and maternal status remain reassuring during the birth process  Description:  Birth OB-Pregnancy care plan goal which identifies if the fetal and maternal status remain reassuring during the birth process  Outcome: Progressing     Problem: Postpartum  Goal: Experiences normal postpartum course  Description:  Postpartum OB-Pregnancy care plan goal which identifies if the mother is experiencing a normal postpartum course  Outcome: Progressing  Goal: Appropriate maternal -  bonding  Description:  Postpartum OB-Pregnancy care plan goal which identifies if the mother and  are bonding appropriately  Outcome: Progressing  Goal: Establishment of infant feeding pattern  Description:  Postpartum OB-Pregnancy care plan goal which identifies if the mother is establishing a feeding pattern with their   Outcome: Progressing  Goal: Incisions, wounds, or drain sites healing without S/S of infection  Outcome: Progressing     Problem: Infection - Adult  Goal: Absence of infection at discharge  Outcome: Progressing  Goal: Absence of infection during hospitalization  Outcome: Progressing     Problem: Safety - Adult  Goal: Free from fall injury  Outcome: Progressing     Problem: Discharge Planning  Goal: Discharge to home or other facility with appropriate resources  Outcome: Progressing     Problem: Chronic Conditions and Co-morbidities  Goal: Patient's chronic conditions and co-morbidity symptoms are monitored and maintained or improved  Outcome: Progressing

## 2024-10-31 LAB
ABNORMAL SPECIMEN VALIDITY TEST: ABNORMAL
INTEGRITY CHECK, CREATININE, URINE: 317 MG/DL (ref 22–250)
INTEGRITY CHECK, OXIDANT, URINE: <40 MG/L
INTEGRITY CHECK, PH, URINE: 6.4 (ref 4.5–9)
INTEGRITY CHECK, SPECIFIC GRAVITY, URINE: 1.03 (ref 1–1.03)
RPR SER QL: NONREACTIVE

## 2024-10-31 PROCEDURE — 3700000025 EPIDURAL BLOCK: Performed by: ANESTHESIOLOGY

## 2024-10-31 PROCEDURE — 1220000001 HC SEMI PRIVATE L&D R&B

## 2024-10-31 PROCEDURE — 2500000003 HC RX 250 WO HCPCS: Performed by: ANESTHESIOLOGY

## 2024-10-31 PROCEDURE — 6360000002 HC RX W HCPCS: Performed by: OBSTETRICS & GYNECOLOGY

## 2024-10-31 PROCEDURE — 6370000000 HC RX 637 (ALT 250 FOR IP)

## 2024-10-31 PROCEDURE — 6360000002 HC RX W HCPCS

## 2024-10-31 PROCEDURE — 6370000000 HC RX 637 (ALT 250 FOR IP): Performed by: OBSTETRICS & GYNECOLOGY

## 2024-10-31 PROCEDURE — 2580000003 HC RX 258: Performed by: OBSTETRICS & GYNECOLOGY

## 2024-10-31 RX ORDER — CITRIC ACID/SODIUM CITRATE 334-500MG
30 SOLUTION, ORAL ORAL ONCE
Status: CANCELLED | OUTPATIENT
Start: 2024-10-31 | End: 2024-10-31

## 2024-10-31 RX ORDER — CALCIUM CARBONATE 500 MG/1
500 TABLET, CHEWABLE ORAL ONCE
Status: COMPLETED | OUTPATIENT
Start: 2024-10-31 | End: 2024-10-31

## 2024-10-31 RX ORDER — ONDANSETRON 2 MG/ML
4 INJECTION INTRAMUSCULAR; INTRAVENOUS EVERY 6 HOURS PRN
Status: DISCONTINUED | OUTPATIENT
Start: 2024-10-31 | End: 2024-11-01

## 2024-10-31 RX ORDER — ONDANSETRON 2 MG/ML
4 INJECTION INTRAMUSCULAR; INTRAVENOUS EVERY 6 HOURS PRN
Status: CANCELLED | OUTPATIENT
Start: 2024-10-31

## 2024-10-31 RX ORDER — CALCIUM CARBONATE 500 MG/1
1000 TABLET, CHEWABLE ORAL 3 TIMES DAILY PRN
Status: DISCONTINUED | OUTPATIENT
Start: 2024-10-31 | End: 2024-11-01

## 2024-10-31 RX ORDER — NALOXONE HYDROCHLORIDE 0.4 MG/ML
INJECTION, SOLUTION INTRAMUSCULAR; INTRAVENOUS; SUBCUTANEOUS PRN
Status: DISCONTINUED | OUTPATIENT
Start: 2024-10-31 | End: 2024-11-01

## 2024-10-31 RX ORDER — CEFAZOLIN 2 G/1
INJECTION, POWDER, FOR SOLUTION INTRAMUSCULAR; INTRAVENOUS
Status: DISCONTINUED
Start: 2024-10-31 | End: 2024-11-01

## 2024-10-31 RX ORDER — WATER 10 ML/10ML
INJECTION INTRAMUSCULAR; INTRAVENOUS; SUBCUTANEOUS
Status: DISCONTINUED
Start: 2024-10-31 | End: 2024-11-01

## 2024-10-31 RX ORDER — SODIUM CHLORIDE, SODIUM LACTATE, POTASSIUM CHLORIDE, AND CALCIUM CHLORIDE .6; .31; .03; .02 G/100ML; G/100ML; G/100ML; G/100ML
1000 INJECTION, SOLUTION INTRAVENOUS ONCE
Status: CANCELLED | OUTPATIENT
Start: 2024-10-31 | End: 2024-10-31

## 2024-10-31 RX ORDER — CITRIC ACID/SODIUM CITRATE 334-500MG
SOLUTION, ORAL ORAL
Status: DISCONTINUED
Start: 2024-10-31 | End: 2024-11-01

## 2024-10-31 RX ADMIN — Medication 15 ML/HR: at 18:58

## 2024-10-31 RX ADMIN — Medication 1 MILLI-UNITS/MIN: at 00:30

## 2024-10-31 RX ADMIN — Medication 15 ML/HR: at 11:03

## 2024-10-31 RX ADMIN — CALCIUM CARBONATE 1000 MG: 500 TABLET, CHEWABLE ORAL at 19:39

## 2024-10-31 RX ADMIN — BUTORPHANOL TARTRATE 2 MG: 2 INJECTION, SOLUTION INTRAMUSCULAR; INTRAVENOUS at 09:45

## 2024-10-31 RX ADMIN — Medication 7 ML: at 11:02

## 2024-10-31 RX ADMIN — CALCIUM CARBONATE 500 MG: 500 TABLET, CHEWABLE ORAL at 22:43

## 2024-10-31 RX ADMIN — SODIUM CHLORIDE, POTASSIUM CHLORIDE, SODIUM LACTATE AND CALCIUM CHLORIDE 500 ML: 600; 310; 30; 20 INJECTION, SOLUTION INTRAVENOUS at 10:02

## 2024-10-31 ASSESSMENT — PAIN DESCRIPTION - DESCRIPTORS: DESCRIPTORS: CRAMPING;PRESSURE

## 2024-10-31 ASSESSMENT — PAIN SCALES - GENERAL: PAINLEVEL_OUTOF10: 7

## 2024-10-31 ASSESSMENT — PAIN DESCRIPTION - ORIENTATION: ORIENTATION: LOWER

## 2024-10-31 ASSESSMENT — PAIN DESCRIPTION - LOCATION: LOCATION: ABDOMEN

## 2024-10-31 NOTE — PROGRESS NOTES
SPIRITUAL HEALTH SERVICES - [facility]  PROGRESS NOTE    Name: Manasa Spann                Yazidi: Sikhism   Anointed (Last Rites): NA    Referral: Routine Visit    Assessment:  Upon entering the room  observes Patient resting in bed. Patient's Mother and SO are present.      Intervention:  Family received prayer prior to Patient giving birth.    Outcome:  Family appreciative of  visit and prayer.    Plan:  Chaplains will remain available to offer spiritual and emotional support as needed.      Electronically signed by VERNON Valente, on 10/31/2024 at 2:07 PM.  Spiritual Care Department  [facility]  [phone]

## 2024-10-31 NOTE — PROGRESS NOTES
Called and notified Dr. Ferguson cervical exam 2-3/80-1, requesting to have membranes ruptured after epidural placement. New order for epidural placement and then artificial rupture of membranes. IV LR fluid bolus started and dressing reinforced with tape. Support person at bedside, call light within reach.

## 2024-10-31 NOTE — PROGRESS NOTES
Spoke with Dr. Ferguson on the phone, ordered that when cervix is reassessed, to notify house officer if able to rupture membranes.

## 2024-10-31 NOTE — PROGRESS NOTES
Spoke with Dr. Fernando on the phone in regards to FSE and IUPC placement from midwife, ctx 3-4 minutes apart. Patient may have TUMs PO and pitocin can be increased up to 22 taras units/min. CRNA at bedside, patient stated increased pain with epidural. Support person at bedside and call light within reach.

## 2024-10-31 NOTE — PROGRESS NOTES
Assisted patient on birthing ball, adjusted ultra sound and toco with abdominal binder, instructed patient to void in 15 minutes and then cervical exam, patient consented and stated understanding. Support person at bedside, call light within reach.

## 2024-10-31 NOTE — PROGRESS NOTES
Dr Ferguson at bedside, contraction pattern reviewed. Orders to start pitocin and patient may have an epidural when ready.

## 2024-10-31 NOTE — PROGRESS NOTES
Repositioned to left side laying exaggerated runners position with peanut ball, adjusted ultra sound and toco. Support person at bedside, call light within reach.

## 2024-10-31 NOTE — PROGRESS NOTES
Patient feeling increase in pain to lower abdomen with contractions, resting in bed at this time on right side laying. Support person at bedside, call light within reach.

## 2024-10-31 NOTE — PROGRESS NOTES
In bed resting, reports lower abdominal cramping, denies vaginal bleeding or leaking of fluid, IUPC removed per physician verbal order at nurses station. Linen provided to patient for sponge bath, support person at bedside, call light within reach.

## 2024-10-31 NOTE — PROGRESS NOTES
Patient requests to wait until after sponge bath and sitting on birthing ball to have cervical exam. Support person at bedside, call light within reach.

## 2024-10-31 NOTE — ANESTHESIA PROCEDURE NOTES
Epidural Block    Patient location during procedure: OB  Reason for block: labor epidural  Staffing  Performed: resident/CRNA   Anesthesiologist: Jose Elias Enrique DO  Resident/CRNA: Alfonzo Adrian APRN - CRNA  Performed by: Alfonzo Adrian APRN - CRNA  Authorized by: Aditya Calderón DO    Epidural  Patient position: sitting  Prep: ChloraPrep  Patient monitoring: continuous pulse ox and frequent blood pressure checks  Approach: midline  Location: L3-4  Injection technique: KINZA air  Provider prep: mask and sterile gloves  Needle  Needle type: Tuohy   Needle gauge: 18 G  Needle length: 3.5 in  Needle insertion depth: 7.5 cm  Catheter type: end hole  Catheter size: 20 G  Catheter at skin depth: 15 cm  Test dose: negativeCatheter Secured: tegaderm and tape  Assessment  Hemodynamics: stable  Attempts: 1  Outcomes: uncomplicated and patient tolerated procedure well  Preanesthetic Checklist  Completed: patient identified, IV checked, site marked, risks and benefits discussed, surgical/procedural consents, equipment checked, pre-op evaluation, timeout performed, anesthesia consent given, oxygen available and monitors applied/VS acknowledged

## 2024-10-31 NOTE — PROGRESS NOTES
Assisted patient to knee chest position, ultra sound adjusted. Support person at bedside, call light within reach.

## 2024-10-31 NOTE — PROGRESS NOTES
House officer at nurses station for AROM, upon entering room patient stated to RN that her significant other will be back in in 15-20 minutes and wants to wait for AROM until then. House officer notified.

## 2024-10-31 NOTE — PROGRESS NOTES
Dr Ferguson aware of patient refusal of cervical exam, contractions every 2-5 minutes apart, and patient comfortable and sleeping at this time. Orders to let patient sleep and continue to increase pitocin per protocol, we will recheck patient in the morning.

## 2024-10-31 NOTE — PROGRESS NOTES
Updated Dr. Ferguson on the phone in regards to patient SVE, efm tracing, repositioning patient in bed, ordered to continue with POC at this time.

## 2024-10-31 NOTE — PROGRESS NOTES
Spoke on the phone with Dr. Ferguson, updated that patient requests to have cervical exam after sitting on birthing ball. We reasses in 20 minutes. To call with update.

## 2024-10-31 NOTE — PROGRESS NOTES
Updated Dr. Ferguson on the phone in regards to patient SVE 4/100/0, repositioning in bed with peanut ball, ordered to continue with POC and to call him for further updates.

## 2024-10-31 NOTE — PROGRESS NOTES
Upon entering room patient reports feeling increased in rectal pressure. SVE 4/100/0, linen changed, repositioned to high fowlers throne position with peanut ball to lean forward on, ultra sound and toco adjusted, support person at bedside, call light within reach.

## 2024-10-31 NOTE — PROGRESS NOTES
Updated Dr. Ferguson on the phone in regards to AROM at 1300 for clear fluid, SVE 2/80-1, and epidural working at this time and Natarajan catheter placed. Ordered to reassess cervix in a couple hours.

## 2024-10-31 NOTE — PLAN OF CARE
Assumed patient care at this time. Patient resting in bed upon entering room. Patient complains of mild pain increasing. Anesthesia called to bedside to evaluate epidural. VSS. Call light within reach. No further complaints at this time.

## 2024-11-01 LAB
COMPLIANCE DRUG ANALYSIS, URINE: NORMAL
THC NORMALIZED, QUANTITIATIVE, URINE: 60.5 NG/ML
THC-COOH, QUANTITATIVE, URINE: 191.7 NG/ML

## 2024-11-01 PROCEDURE — 51701 INSERT BLADDER CATHETER: CPT

## 2024-11-01 PROCEDURE — 88307 TISSUE EXAM BY PATHOLOGIST: CPT

## 2024-11-01 PROCEDURE — 7200000001 HC VAGINAL DELIVERY

## 2024-11-01 PROCEDURE — 6370000000 HC RX 637 (ALT 250 FOR IP)

## 2024-11-01 PROCEDURE — 6370000000 HC RX 637 (ALT 250 FOR IP): Performed by: PHYSICIAN ASSISTANT

## 2024-11-01 PROCEDURE — 0HQ9XZZ REPAIR PERINEUM SKIN, EXTERNAL APPROACH: ICD-10-PCS | Performed by: OBSTETRICS & GYNECOLOGY

## 2024-11-01 PROCEDURE — 1220000000 HC SEMI PRIVATE OB R&B

## 2024-11-01 PROCEDURE — 3E033VJ INTRODUCTION OF OTHER HORMONE INTO PERIPHERAL VEIN, PERCUTANEOUS APPROACH: ICD-10-PCS | Performed by: OBSTETRICS & GYNECOLOGY

## 2024-11-01 PROCEDURE — 2580000003 HC RX 258

## 2024-11-01 RX ORDER — MISOPROSTOL 200 UG/1
TABLET ORAL
Status: DISCONTINUED
Start: 2024-11-01 | End: 2024-11-01

## 2024-11-01 RX ORDER — ONDANSETRON 2 MG/ML
4 INJECTION INTRAMUSCULAR; INTRAVENOUS EVERY 6 HOURS PRN
Status: DISCONTINUED | OUTPATIENT
Start: 2024-11-01 | End: 2024-11-04 | Stop reason: HOSPADM

## 2024-11-01 RX ORDER — CALCIUM CARBONATE 500 MG/1
500 TABLET, CHEWABLE ORAL 3 TIMES DAILY PRN
Status: DISPENSED | OUTPATIENT
Start: 2024-11-01 | End: 2024-11-03

## 2024-11-01 RX ORDER — FERROUS SULFATE 325(65) MG
325 TABLET ORAL EVERY OTHER DAY
Status: DISCONTINUED | OUTPATIENT
Start: 2024-11-01 | End: 2024-11-04 | Stop reason: HOSPADM

## 2024-11-01 RX ORDER — SODIUM CHLORIDE 9 MG/ML
INJECTION, SOLUTION INTRAVENOUS PRN
Status: DISCONTINUED | OUTPATIENT
Start: 2024-11-01 | End: 2024-11-04 | Stop reason: HOSPADM

## 2024-11-01 RX ORDER — ACETAMINOPHEN 500 MG
TABLET ORAL
Status: DISPENSED
Start: 2024-11-01 | End: 2024-11-02

## 2024-11-01 RX ORDER — IBUPROFEN 800 MG/1
800 TABLET, FILM COATED ORAL EVERY 8 HOURS SCHEDULED
Status: DISCONTINUED | OUTPATIENT
Start: 2024-11-01 | End: 2024-11-04 | Stop reason: HOSPADM

## 2024-11-01 RX ORDER — DOCUSATE SODIUM 100 MG/1
100 CAPSULE, LIQUID FILLED ORAL 2 TIMES DAILY
Status: DISCONTINUED | OUTPATIENT
Start: 2024-11-01 | End: 2024-11-04 | Stop reason: HOSPADM

## 2024-11-01 RX ORDER — HYDROCODONE BITARTRATE AND ACETAMINOPHEN 5; 325 MG/1; MG/1
1 TABLET ORAL EVERY 6 HOURS PRN
Status: DISCONTINUED | OUTPATIENT
Start: 2024-11-01 | End: 2024-11-04 | Stop reason: HOSPADM

## 2024-11-01 RX ORDER — SODIUM CHLORIDE 0.9 % (FLUSH) 0.9 %
5-40 SYRINGE (ML) INJECTION PRN
Status: DISCONTINUED | OUTPATIENT
Start: 2024-11-01 | End: 2024-11-04 | Stop reason: HOSPADM

## 2024-11-01 RX ORDER — SODIUM CHLORIDE 0.9 % (FLUSH) 0.9 %
5-40 SYRINGE (ML) INJECTION EVERY 12 HOURS SCHEDULED
Status: DISCONTINUED | OUTPATIENT
Start: 2024-11-01 | End: 2024-11-04 | Stop reason: HOSPADM

## 2024-11-01 RX ORDER — OXYTOCIN 10 [USP'U]/ML
INJECTION, SOLUTION INTRAMUSCULAR; INTRAVENOUS
Status: DISCONTINUED
Start: 2024-11-01 | End: 2024-11-01

## 2024-11-01 RX ORDER — ACETAMINOPHEN 500 MG
1000 TABLET ORAL EVERY 8 HOURS SCHEDULED
Status: DISCONTINUED | OUTPATIENT
Start: 2024-11-01 | End: 2024-11-04 | Stop reason: HOSPADM

## 2024-11-01 RX ORDER — MODIFIED LANOLIN
OINTMENT (GRAM) TOPICAL PRN
Status: DISCONTINUED | OUTPATIENT
Start: 2024-11-01 | End: 2024-11-04 | Stop reason: HOSPADM

## 2024-11-01 RX ORDER — ONDANSETRON 4 MG/1
4 TABLET, ORALLY DISINTEGRATING ORAL EVERY 6 HOURS PRN
Status: DISCONTINUED | OUTPATIENT
Start: 2024-11-01 | End: 2024-11-04 | Stop reason: HOSPADM

## 2024-11-01 RX ADMIN — Medication: at 20:06

## 2024-11-01 RX ADMIN — ACETAMINOPHEN 1000 MG: 500 TABLET ORAL at 09:01

## 2024-11-01 RX ADMIN — DOCUSATE SODIUM 100 MG: 100 CAPSULE, LIQUID FILLED ORAL at 09:00

## 2024-11-01 RX ADMIN — Medication: at 09:00

## 2024-11-01 RX ADMIN — CALCIUM CARBONATE 500 MG: 500 TABLET, CHEWABLE ORAL at 14:28

## 2024-11-01 RX ADMIN — DOCUSATE SODIUM 100 MG: 100 CAPSULE, LIQUID FILLED ORAL at 19:40

## 2024-11-01 RX ADMIN — CALCIUM CARBONATE 500 MG: 500 TABLET, CHEWABLE ORAL at 19:40

## 2024-11-01 RX ADMIN — FERROUS SULFATE TAB 325 MG (65 MG ELEMENTAL FE) 325 MG: 325 (65 FE) TAB at 09:01

## 2024-11-01 RX ADMIN — IBUPROFEN 800 MG: 800 TABLET ORAL at 05:13

## 2024-11-01 RX ADMIN — CALCIUM CARBONATE 500 MG: 500 TABLET, CHEWABLE ORAL at 09:01

## 2024-11-01 RX ADMIN — ACETAMINOPHEN 1000 MG: 500 TABLET ORAL at 19:40

## 2024-11-01 RX ADMIN — IBUPROFEN 800 MG: 800 TABLET ORAL at 14:28

## 2024-11-01 RX ADMIN — SODIUM CHLORIDE, PRESERVATIVE FREE 10 ML: 5 INJECTION INTRAVENOUS at 19:41

## 2024-11-01 ASSESSMENT — PAIN DESCRIPTION - ORIENTATION: ORIENTATION: LOWER

## 2024-11-01 ASSESSMENT — PAIN DESCRIPTION - DESCRIPTORS
DESCRIPTORS: CRAMPING
DESCRIPTORS: CRAMPING
DESCRIPTORS: CRAMPING;BURNING
DESCRIPTORS: CRAMPING

## 2024-11-01 ASSESSMENT — PAIN SCALES - GENERAL
PAINLEVEL_OUTOF10: 6
PAINLEVEL_OUTOF10: 8
PAINLEVEL_OUTOF10: 8
PAINLEVEL_OUTOF10: 4
PAINLEVEL_OUTOF10: 4
PAINLEVEL_OUTOF10: 7

## 2024-11-01 ASSESSMENT — PAIN DESCRIPTION - PAIN TYPE: TYPE: ACUTE PAIN

## 2024-11-01 ASSESSMENT — PAIN DESCRIPTION - ONSET: ONSET: GRADUAL

## 2024-11-01 ASSESSMENT — PAIN - FUNCTIONAL ASSESSMENT: PAIN_FUNCTIONAL_ASSESSMENT: ACTIVITIES ARE NOT PREVENTED

## 2024-11-01 ASSESSMENT — PAIN DESCRIPTION - LOCATION
LOCATION: ABDOMEN
LOCATION: ABDOMEN
LOCATION: ABDOMEN;VAGINA
LOCATION: ABDOMEN

## 2024-11-01 ASSESSMENT — PAIN DESCRIPTION - FREQUENCY: FREQUENCY: INTERMITTENT

## 2024-11-01 NOTE — PROGRESS NOTES
Patient assisted to bathroom via steady. Patient able to void and kingsley-care performed. Mother/baby unit notified for transfer.

## 2024-11-01 NOTE — PROGRESS NOTES
Reviewed tracing with Dr. Vargas  Patient is pushing effectively, close to delivery.   Variable Decelerations with contractions, moderate variability between ctx.   Update  In 15 minutes or at delivery of baby.

## 2024-11-01 NOTE — PROGRESS NOTES
Spoke with patient regarding Dr. Ferguson decision for patient . Patient states that she does not want a  at this time and wants to continue with current plan. Will notify physician.

## 2024-11-01 NOTE — PROGRESS NOTES
Dr. Ferguson called into nurses station for an update. Cervical exam unchanged, would like to proceed with a  for failure to progress at 2230.

## 2024-11-01 NOTE — PLAN OF CARE
Problem: Pain  Goal: Verbalizes/displays adequate comfort level or baseline comfort level  11/1/2024 1551 by Ofe So, RN  Outcome: Progressing  Flowsheets (Taken 11/1/2024 1100)  Verbalizes/displays adequate comfort level or baseline comfort level:   Encourage patient to monitor pain and request assistance   Assess pain using appropriate pain scale   Administer analgesics based on type and severity of pain and evaluate response   Implement non-pharmacological measures as appropriate and evaluate response   Consider cultural and social influences on pain and pain management  11/1/2024 0646 by Jeannine Bowens, RN  Outcome: Progressing

## 2024-11-01 NOTE — PLAN OF CARE
Problem: Pain  Goal: Verbalizes/displays adequate comfort level or baseline comfort level  2024 0646 by Jeannine Bowens, RN  Outcome: Progressing     Problem: Vaginal Birth or  Section  Goal: Fetal and maternal status remain reassuring during the birth process  Description:  Birth OB-Pregnancy care plan goal which identifies if the fetal and maternal status remain reassuring during the birth process  2024 0646 by Jeannine Bowens, RN  Outcome: Progressing     Problem: Postpartum  Goal: Experiences normal postpartum course  Description:  Postpartum OB-Pregnancy care plan goal which identifies if the mother is experiencing a normal postpartum course  Outcome: Progressing     Problem: Postpartum  Goal: Appropriate maternal -  bonding  Description:  Postpartum OB-Pregnancy care plan goal which identifies if the mother and  are bonding appropriately  Outcome: Progressing     Problem: Postpartum  Goal: Establishment of infant feeding pattern  Description:  Postpartum OB-Pregnancy care plan goal which identifies if the mother is establishing a feeding pattern with their   Outcome: Progressing     Problem: Postpartum  Goal: Incisions, wounds, or drain sites healing without S/S of infection  Outcome: Progressing     Problem: Discharge Planning  Goal: Discharge to home or other facility with appropriate resources  Outcome: Progressing

## 2024-11-01 NOTE — LACTATION NOTE
First time mom.  Assisted with positioning and latch on the right breast in football and cross cradle hold. Baby was on and off latch but will improve with frequent attempts.Instructed on normal infant behavior, benefits of colostrum/breast milk for baby and mom,  benefits of skin to skin and components of safe positioning.  Encouraged rooming-in and avoidance of pacifier use until breastfeeding is well established.  Reviewed latch techniques, positioning, signs of effective milk transfer, waking techniques and the importance of frequent feedings- 8-12 times/ 24 hrs to stimulate/maintain milk production. Taught hand expression and encouraged to express drops of colostrum at start of feeding.  Reviewed hunger cues and expected urine/stool output and transition.  Encouraged to feed infant as often and for as long as the infant wishes to do so.  Mom has a double electric breast pump for home use.   Went over breastfeeding resources and the breastfeeding guide.  Offered support and encouraged to call for assistance or concerns.

## 2024-11-01 NOTE — PROGRESS NOTES
Normal  delivery with Cinthia Jimenez CNM  via  at 0243. Apgars 9/9. [place] to normal nursery.

## 2024-11-01 NOTE — PROGRESS NOTES
Patient was maxed out on pitocin.  Pitocin break at 1045, was restarted 1 hr later  Pt went to complete and felt pressure to push  Pushing effectively

## 2024-11-01 NOTE — PROGRESS NOTES
[facility] called in for an update. Aware Pitocin was at 20 taras-units/min since 0650, it was increased to 22 taras-units/min at 2131. Pt was been 6 cm since 1915. New orders received.

## 2024-11-01 NOTE — CARE COORDINATION
Social Work discharge planning  Noted pt + for THC 10/30/24.   SW spoke to pt who lives alone. She advised her son's name is Daniel King. Reported FOB is Ted King (2/6/90). Pt said reported FOB is \"in and out\". She denied hx DV.  Pt has [facility] Medicaid, and is connected with WIC. She declined [facility] referral.  Pt said her mother is supportive. Pt said she has PNC with Dr Ferguson and [facility]. Pediatric care will be with [facility]. Pt drives and has transportation. She has car seat, crib, basinet.     Pt had positive UDS 4/30/24 for THC. Her UDS 10/30/24 was negative. Baby has pending Cord Stat. Pt said she stopped using once she found out she was pregnant. She denied other drug use hx. Pt said she has depression hx, and plans to investigate outpt counseling in near future. She said she will call her insurance to get providers.     Referral called to Brandi with [facility] [phone] due to + UDS 4/30/24.   Will need to call for update, as their eval is pending.   Electronically signed by MICHEAL Balderas on 11/1/2024 at 1:36 PM     Addendum  Per Paulino with [facility] [phone], referral screened out. Baby CAN discharge home with mom.  Electronically signed by MICHEAL Balderas on 11/2/2024 at 8:29 AM

## 2024-11-01 NOTE — PROGRESS NOTES
Updated Tony CNM on patient SVE C/100/+1, contraction pattern, and EFM moderate variable with accels and intermittent variables. Informed to begin pushing with patient.

## 2024-11-01 NOTE — PROGRESS NOTES
Patient oriented to room 314, call light and telephone usage shown to the patient. New admission vital signs and assessment completed as charted. New admission paperwork gone over with the patient including the TDAP and Hepatitis B vaccines. The patient is refusing the TDAP and she would like the baby to receive the Hepatitis B vaccine. Instructed the patient to call for first time out of bed. All questions answered, will monitor.

## 2024-11-01 NOTE — PROGRESS NOTES
Dr. Ferguson called back in regarding refusal of c/s per patient request. States that he is signing out to Tony MANE who is on for the group tonight.    Normal for race

## 2024-11-01 NOTE — ANESTHESIA POSTPROCEDURE EVALUATION
Department of Anesthesiology  Postprocedure Note    Patient: Manasa Spann  MRN: [id number]  YOB: 1995  Date of evaluation: 2024    Procedure Summary       Date: 10/31/24 Room / Location: [address]    Anesthesia Start: 1044 Anesthesia Stop:     Procedures:        SECTION (Uterus)      Labor Analgesia Diagnosis:       Failure to progress in labor      (Failure to progress in labor [O62.2])    Surgeons: Doyn Ferguson MD Responsible Provider: Aditya Calderón DO    Anesthesia Type: epidural ASA Status: 2            Anesthesia Type: No value filed.    Ever Phase I:      Ever Phase II:      Anesthesia Post Evaluation    Patient location during evaluation: bedside  Patient participation: complete - patient participated  Level of consciousness: awake and alert  Pain score: 1  Airway patency: patent  Nausea & Vomiting: no nausea and no vomiting  Cardiovascular status: hemodynamically stable and blood pressure returned to baseline  Respiratory status: acceptable and spontaneous ventilation  Hydration status: stable  Pain management: adequate and satisfactory to patient        No notable events documented.

## 2024-11-01 NOTE — L&D DELIVERY SUMMARY NOTE
Vaginal Delivery Note    Details of Procedure:   The patient is a 29 y.o. female at 39w1d   OB History          5    Para        Term                AB   4    Living             SAB   4    IAB        Ectopic        Molar        Multiple        Live Births                 who was admitted for induction. She received the following interventions: AROM, vaginal Cytotec, and IV Pitocin induction She was known to be GBS negative and did not receive antibiotic prophylaxis. The patient progressed well,did receive an epidural, became complete and started to push. After pushing for 2 hrs the fetal head was at the perineum, nuchal cord deduced, placed on mother abdomen. Cord was clamped and cut and infant handed off to the waiting nurse for evaluation. The delivery of the placenta was spontaneous. The perineum and vagina were explored and a first degree laceration was repaired in standard fashion.    Viable male infant delivered at 0243, Apgars of 9 and 9. Weight pending    Anesthesia:  epidural anesthesia    Estimated blood loss:  400ml    Specimen:  Placenta sent to pathology - velamentous cord insertion    Cord blood sent Yes    Complications:  uterine inertia- IV fell out at delivery. IM pit, methergine and cytotec given. IV replaced and PITOCIN given     Condition:  infant stable to general nursery    Dr. Vargas notified at delivery of viable male infant. Apgars 9/9     THOMAS Pollock CNM

## 2024-11-02 LAB
HCT VFR BLD AUTO: 25.8 % (ref 34–48)
HGB BLD-MCNC: 7.7 G/DL (ref 11.5–15.5)

## 2024-11-02 PROCEDURE — 85018 HEMOGLOBIN: CPT

## 2024-11-02 PROCEDURE — 6370000000 HC RX 637 (ALT 250 FOR IP)

## 2024-11-02 PROCEDURE — 85014 HEMATOCRIT: CPT

## 2024-11-02 PROCEDURE — 6370000000 HC RX 637 (ALT 250 FOR IP): Performed by: PHYSICIAN ASSISTANT

## 2024-11-02 PROCEDURE — 1220000000 HC SEMI PRIVATE OB R&B

## 2024-11-02 RX ADMIN — IBUPROFEN 800 MG: 800 TABLET ORAL at 01:21

## 2024-11-02 RX ADMIN — IBUPROFEN 800 MG: 800 TABLET ORAL at 18:31

## 2024-11-02 RX ADMIN — DOCUSATE SODIUM 100 MG: 100 CAPSULE, LIQUID FILLED ORAL at 20:25

## 2024-11-02 RX ADMIN — FERROUS SULFATE TAB 325 MG (65 MG ELEMENTAL FE) 325 MG: 325 (65 FE) TAB at 08:16

## 2024-11-02 RX ADMIN — ACETAMINOPHEN 1000 MG: 500 TABLET ORAL at 20:23

## 2024-11-02 RX ADMIN — ACETAMINOPHEN 1000 MG: 500 TABLET ORAL at 08:16

## 2024-11-02 RX ADMIN — DOCUSATE SODIUM 100 MG: 100 CAPSULE, LIQUID FILLED ORAL at 08:16

## 2024-11-02 RX ADMIN — CALCIUM CARBONATE 500 MG: 500 TABLET, CHEWABLE ORAL at 17:32

## 2024-11-02 ASSESSMENT — PAIN - FUNCTIONAL ASSESSMENT: PAIN_FUNCTIONAL_ASSESSMENT: ACTIVITIES ARE NOT PREVENTED

## 2024-11-02 ASSESSMENT — PAIN SCALES - GENERAL
PAINLEVEL_OUTOF10: 3
PAINLEVEL_OUTOF10: 7
PAINLEVEL_OUTOF10: 7
PAINLEVEL_OUTOF10: 6
PAINLEVEL_OUTOF10: 6

## 2024-11-02 ASSESSMENT — PAIN DESCRIPTION - ONSET: ONSET: GRADUAL

## 2024-11-02 ASSESSMENT — PAIN DESCRIPTION - PAIN TYPE: TYPE: ACUTE PAIN

## 2024-11-02 ASSESSMENT — PAIN DESCRIPTION - FREQUENCY: FREQUENCY: INTERMITTENT

## 2024-11-02 ASSESSMENT — PAIN DESCRIPTION - LOCATION
LOCATION: ABDOMEN

## 2024-11-02 ASSESSMENT — PAIN DESCRIPTION - DESCRIPTORS
DESCRIPTORS: CRAMPING

## 2024-11-02 ASSESSMENT — PAIN DESCRIPTION - ORIENTATION: ORIENTATION: LOWER

## 2024-11-02 NOTE — LACTATION NOTE
Baby latched well at this time, mom handles baby well. Encouraged frequent feeds at breast, hand expression and skin to skin to establish supply. Support provided and encouraged to call with any needs.

## 2024-11-02 NOTE — PLAN OF CARE
Problem: Pain  Goal: Verbalizes/displays adequate comfort level or baseline comfort level  Outcome: Progressing  Flowsheets  Taken 11/2/2024 1057 by Ofe So RN  Verbalizes/displays adequate comfort level or baseline comfort level:   Assess pain using appropriate pain scale   Encourage patient to monitor pain and request assistance   Administer analgesics based on type and severity of pain and evaluate response   Implement non-pharmacological measures as appropriate and evaluate response   Consider cultural and social influences on pain and pain management  Taken 11/1/2024 2300 by Laura Bolanos RN  Verbalizes/displays adequate comfort level or baseline comfort level:   Encourage patient to monitor pain and request assistance   Assess pain using appropriate pain scale   Administer analgesics based on type and severity of pain and evaluate response   Implement non-pharmacological measures as appropriate and evaluate response   Consider cultural and social influences on pain and pain management   Notify Licensed Independent Practitioner if interventions unsuccessful or patient reports new pain

## 2024-11-02 NOTE — PROGRESS NOTES
[employer] [place] [facility] screening results were discussed with parent. Questions answered. Brochure given to parent. Advised to monitor developmental milestones and contact physician for any concerns.   Suzy Flores, AuD

## 2024-11-03 ENCOUNTER — APPOINTMENT (OUTPATIENT)
Dept: ULTRASOUND IMAGING | Age: 29
DRG: 560 | End: 2024-11-03
Payer: MEDICAID

## 2024-11-03 LAB
ERYTHROCYTE [DISTWIDTH] IN BLOOD BY AUTOMATED COUNT: 17.3 % (ref 11.5–15)
HCT VFR BLD AUTO: 26.6 % (ref 34–48)
HGB BLD-MCNC: 8 G/DL (ref 11.5–15.5)
MCH RBC QN AUTO: 22.4 PG (ref 26–35)
MCHC RBC AUTO-ENTMCNC: 30.1 G/DL (ref 32–34.5)
MCV RBC AUTO: 74.5 FL (ref 80–99.9)
PLATELET, FLUORESCENCE: 215 K/UL (ref 130–450)
PMV BLD AUTO: 12.4 FL (ref 7–12)
RBC # BLD AUTO: 3.57 M/UL (ref 3.5–5.5)
WBC OTHER # BLD: 10.7 K/UL (ref 4.5–11.5)

## 2024-11-03 PROCEDURE — 76881 US COMPL JOINT R-T W/IMG: CPT

## 2024-11-03 PROCEDURE — 6370000000 HC RX 637 (ALT 250 FOR IP): Performed by: OBSTETRICS & GYNECOLOGY

## 2024-11-03 PROCEDURE — 85027 COMPLETE CBC AUTOMATED: CPT

## 2024-11-03 PROCEDURE — 6370000000 HC RX 637 (ALT 250 FOR IP)

## 2024-11-03 PROCEDURE — 1220000000 HC SEMI PRIVATE OB R&B

## 2024-11-03 RX ORDER — CEPHALEXIN 500 MG/1
500 CAPSULE ORAL EVERY 8 HOURS SCHEDULED
Status: DISCONTINUED | OUTPATIENT
Start: 2024-11-03 | End: 2024-11-04

## 2024-11-03 RX ORDER — IBUPROFEN 800 MG/1
800 TABLET, FILM COATED ORAL EVERY 8 HOURS SCHEDULED
Qty: 120 TABLET | Refills: 0 | Status: SHIPPED | OUTPATIENT
Start: 2024-11-03

## 2024-11-03 RX ORDER — LIDOCAINE HYDROCHLORIDE 10 MG/ML
0.8 INJECTION, SOLUTION EPIDURAL; INFILTRATION; INTRACAUDAL; PERINEURAL
Status: DISCONTINUED | OUTPATIENT
Start: 2024-11-03 | End: 2024-11-03 | Stop reason: CLARIF

## 2024-11-03 RX ORDER — PETROLATUM,WHITE/LANOLIN
OINTMENT (GRAM) TOPICAL PRN
Status: DISCONTINUED | OUTPATIENT
Start: 2024-11-03 | End: 2024-11-03 | Stop reason: CLARIF

## 2024-11-03 RX ORDER — CALCIUM CARBONATE 500 MG/1
500 TABLET, CHEWABLE ORAL 3 TIMES DAILY PRN
Status: DISCONTINUED | OUTPATIENT
Start: 2024-11-03 | End: 2024-11-04 | Stop reason: HOSPADM

## 2024-11-03 RX ADMIN — DOCUSATE SODIUM 100 MG: 100 CAPSULE, LIQUID FILLED ORAL at 09:59

## 2024-11-03 RX ADMIN — FERROUS SULFATE TAB 325 MG (65 MG ELEMENTAL FE) 325 MG: 325 (65 FE) TAB at 09:59

## 2024-11-03 RX ADMIN — CEPHALEXIN 500 MG: 500 CAPSULE ORAL at 15:14

## 2024-11-03 RX ADMIN — IBUPROFEN 800 MG: 800 TABLET ORAL at 09:59

## 2024-11-03 RX ADMIN — IBUPROFEN 800 MG: 800 TABLET ORAL at 01:26

## 2024-11-03 RX ADMIN — CALCIUM CARBONATE 500 MG: 500 TABLET, CHEWABLE ORAL at 15:14

## 2024-11-03 RX ADMIN — CEPHALEXIN 500 MG: 500 CAPSULE ORAL at 21:12

## 2024-11-03 RX ADMIN — HYDROCODONE BITARTRATE AND ACETAMINOPHEN 1 TABLET: 5; 325 TABLET ORAL at 22:34

## 2024-11-03 RX ADMIN — ACETAMINOPHEN 1000 MG: 500 TABLET ORAL at 15:19

## 2024-11-03 RX ADMIN — IBUPROFEN 800 MG: 800 TABLET ORAL at 21:12

## 2024-11-03 RX ADMIN — DOCUSATE SODIUM 100 MG: 100 CAPSULE, LIQUID FILLED ORAL at 21:12

## 2024-11-03 RX ADMIN — CALCIUM CARBONATE 500 MG: 500 TABLET, CHEWABLE ORAL at 22:34

## 2024-11-03 ASSESSMENT — PAIN - FUNCTIONAL ASSESSMENT: PAIN_FUNCTIONAL_ASSESSMENT: ACTIVITIES ARE NOT PREVENTED

## 2024-11-03 ASSESSMENT — PAIN SCALES - GENERAL
PAINLEVEL_OUTOF10: 7
PAINLEVEL_OUTOF10: 8
PAINLEVEL_OUTOF10: 5
PAINLEVEL_OUTOF10: 6
PAINLEVEL_OUTOF10: 3

## 2024-11-03 ASSESSMENT — PAIN DESCRIPTION - LOCATION
LOCATION: PERINEUM;OTHER (COMMENT)
LOCATION: ABDOMEN
LOCATION: ABDOMEN

## 2024-11-03 ASSESSMENT — PAIN DESCRIPTION - DESCRIPTORS
DESCRIPTORS: SORE
DESCRIPTORS: CRAMPING
DESCRIPTORS: SORE

## 2024-11-03 ASSESSMENT — PAIN DESCRIPTION - ORIENTATION: ORIENTATION: MID

## 2024-11-03 NOTE — LACTATION NOTE
Assisted mom with cross-cradle hold on the right side, baby latched easily, swallows heard throughout the feeding. Ebp set up at the bedside, encouraged mom to pump for a short time after feeding baby due to axillary fullness/ hardness. Discussed ways to handle and treat engorgement. Encouraged frequent feeds to establish milk supply. Reviewed benefits and safety of skin to skin. Inst on adequate I/O and importance of keeping track of diapers at home. Instructed on signs of dehydration such as infant refusing to feed, decreased wet diapers and infant becoming listless and notify provider if these occur. Reviewed with mom the importance of notifying the physician if baby looks more jaundiced. Lactation office # given if follow-up needed, as well as support group information. Encouraged to call with any concerns. Support and encouragement given.

## 2024-11-03 NOTE — PROGRESS NOTES
Answering service called for Dr Bertrand with general surgery to see patient regarding bilateral axillary lesions. Stated he would see this patient if she is still inpatient tomorrow.     Dr Ferguson updated and ordered to cancel discharge.

## 2024-11-03 NOTE — PROGRESS NOTES
SUBJECTIVE:   Patient without complaint    OBJECTIVE:   /70   Pulse 85   Temp 97.6 °F (36.4 °C) (Oral)   Resp 16   Ht 1.753 m (5' 9\")   Wt 115.2 kg (254 lb)   SpO2 96%   Breastfeeding Unknown   BMI 37.51 kg/m²    Lochia normal rubra   Uterus firm, nontender   Bilateral galactocele axilla = left tender = right nontender    ASSESSMENT/PLAN:   Postpartum Day #2   Discharge home with precaution   Galactocele - sono pend and cbc = rx motrin   Follow-up 6 weeks    Dony Ferguson MD 11/3/2024 9:54 AM

## 2024-11-03 NOTE — DISCHARGE INSTRUCTIONS
Follow-up with your OB doctor in  6 weeks for vaginal delivery unless otherwise instructed.   Call office for an appointment.  For breastfeeding support, you can contact our lactation specialists at [phone] or [phone]    DIET  Eat a well balanced diet focusing on foods high in fiber and protein  Drink plenty of fluids especially water.  To avoid constipation you may take a mild stool softener as recommended by your doctor or midwife.    ACTIVITY  Gradually increase your activity.  Resume exercise regimen only after advised by your doctor or midwife.  Avoid lifting anything heavier than your baby or a gallon of milk for SIX weeks.   Avoid driving until your doctor or midwife has given their approval.  Rise slowly from a lying to sitting and then a standing position.  Climb stairs one at a time.  Use caution when carrying your baby up and down the stairs.  No sexual activity for 6 weeks or until advised by your doctor - Nothing in vagina: intercourse, tampons, or douching.   Be prepared to discuss family planning at your follow-up OB visit.   You may feel tired or have a lack of energy.  You may continue your prenatal vitamin to replenish nutrients post delivery.  Nap when baby naps to catch up on sleep.  May return to work or school in 6 weeks or as directed by OB.     EMOTIONS  You may feed gupta, sad, teary, & overwhelmed.  Contact your OB provider if you feel you may be showing signs of postpartum depression, or have thoughts of harming yourself or your infant.  If infant will not stop crying, contact another adult for help or place infant in their crib on their back and take a break.  NEVER shake your infant.      BLEEDING  Vaginal bleeding will decrease in amount over the next few weeks.  You will notice that as your activity increases, your flow may increase.  This is your body's way of telling you, you need to take things easier and rest more often.  Call your OB/ER if you are saturating more than

## 2024-11-04 VITALS
WEIGHT: 254 LBS | RESPIRATION RATE: 16 BRPM | DIASTOLIC BLOOD PRESSURE: 70 MMHG | OXYGEN SATURATION: 99 % | TEMPERATURE: 98.6 F | BODY MASS INDEX: 37.62 KG/M2 | SYSTOLIC BLOOD PRESSURE: 147 MMHG | HEART RATE: 77 BPM | HEIGHT: 69 IN

## 2024-11-04 PROCEDURE — 6370000000 HC RX 637 (ALT 250 FOR IP): Performed by: OBSTETRICS & GYNECOLOGY

## 2024-11-04 PROCEDURE — 6370000000 HC RX 637 (ALT 250 FOR IP)

## 2024-11-04 RX ORDER — DOXYCYCLINE 100 MG/1
100 CAPSULE ORAL EVERY 12 HOURS SCHEDULED
Qty: 28 CAPSULE | Refills: 0 | Status: SHIPPED | OUTPATIENT
Start: 2024-11-04 | End: 2024-11-18

## 2024-11-04 RX ORDER — HYDROCODONE BITARTRATE AND ACETAMINOPHEN 5; 325 MG/1; MG/1
1 TABLET ORAL EVERY 8 HOURS PRN
Qty: 10 TABLET | Refills: 0 | Status: SHIPPED | OUTPATIENT
Start: 2024-11-04 | End: 2024-11-07

## 2024-11-04 RX ORDER — CLINDAMYCIN PHOSPHATE 10 UG/ML
LOTION TOPICAL
Qty: 60 G | Refills: 2 | Status: SHIPPED | OUTPATIENT
Start: 2024-11-04

## 2024-11-04 RX ORDER — CLINDAMYCIN PHOSPHATE 10 UG/ML
LOTION TOPICAL
Qty: 60 G | Refills: 2 | Status: SHIPPED | OUTPATIENT
Start: 2024-11-04 | End: 2024-11-04

## 2024-11-04 RX ORDER — CLINDAMYCIN PHOSPHATE 10 UG/ML
LOTION TOPICAL 2 TIMES DAILY
Status: DISCONTINUED | OUTPATIENT
Start: 2024-11-04 | End: 2024-11-04 | Stop reason: HOSPADM

## 2024-11-04 RX ORDER — DOXYCYCLINE 100 MG/1
100 CAPSULE ORAL EVERY 12 HOURS SCHEDULED
Status: DISCONTINUED | OUTPATIENT
Start: 2024-11-04 | End: 2024-11-04 | Stop reason: HOSPADM

## 2024-11-04 RX ORDER — CEPHALEXIN 500 MG/1
500 CAPSULE ORAL 3 TIMES DAILY
Qty: 30 CAPSULE | Refills: 0 | Status: SHIPPED | OUTPATIENT
Start: 2024-11-04 | End: 2024-11-04 | Stop reason: HOSPADM

## 2024-11-04 RX ORDER — DOXYCYCLINE 100 MG/1
100 CAPSULE ORAL EVERY 12 HOURS SCHEDULED
Qty: 28 CAPSULE | Refills: 0 | Status: SHIPPED | OUTPATIENT
Start: 2024-11-04 | End: 2024-11-04

## 2024-11-04 RX ADMIN — IBUPROFEN 800 MG: 800 TABLET ORAL at 14:14

## 2024-11-04 RX ADMIN — DOCUSATE SODIUM 100 MG: 100 CAPSULE, LIQUID FILLED ORAL at 11:57

## 2024-11-04 RX ADMIN — CEPHALEXIN 500 MG: 500 CAPSULE ORAL at 05:22

## 2024-11-04 RX ADMIN — HYDROCODONE BITARTRATE AND ACETAMINOPHEN 1 TABLET: 5; 325 TABLET ORAL at 05:22

## 2024-11-04 RX ADMIN — CEPHALEXIN 500 MG: 500 CAPSULE ORAL at 14:15

## 2024-11-04 RX ADMIN — HYDROCODONE BITARTRATE AND ACETAMINOPHEN 1 TABLET: 5; 325 TABLET ORAL at 12:04

## 2024-11-04 ASSESSMENT — PAIN DESCRIPTION - FREQUENCY: FREQUENCY: CONTINUOUS

## 2024-11-04 ASSESSMENT — PAIN SCALES - GENERAL
PAINLEVEL_OUTOF10: 7
PAINLEVEL_OUTOF10: 10
PAINLEVEL_OUTOF10: 9

## 2024-11-04 ASSESSMENT — PAIN - FUNCTIONAL ASSESSMENT
PAIN_FUNCTIONAL_ASSESSMENT: ACTIVITIES ARE NOT PREVENTED
PAIN_FUNCTIONAL_ASSESSMENT: ACTIVITIES ARE NOT PREVENTED

## 2024-11-04 ASSESSMENT — PAIN DESCRIPTION - ONSET: ONSET: ON-GOING

## 2024-11-04 ASSESSMENT — PAIN DESCRIPTION - DESCRIPTORS
DESCRIPTORS: DISCOMFORT;SORE;TENDER
DESCRIPTORS: SORE
DESCRIPTORS: DISCOMFORT;SORE;TENDER

## 2024-11-04 ASSESSMENT — PAIN DESCRIPTION - LOCATION
LOCATION: OTHER (COMMENT)

## 2024-11-04 ASSESSMENT — PAIN DESCRIPTION - PAIN TYPE: TYPE: ACUTE PAIN

## 2024-11-04 ASSESSMENT — PAIN DESCRIPTION - ORIENTATION: ORIENTATION: LEFT

## 2024-11-04 NOTE — PROGRESS NOTES
Spiritual Health History and Assessment/Progress Note  [facility]    Rituals, Rites and Sacraments,  ,  ,      Name: Manasa Spann MRN: [id number]    Age: 29 y.o.     Sex: female   Language: English   Scientologist: [employer]   38 weeks gestation of pregnancy     Date: 11/4/2024                           Spiritual Assessment began in [facility] 3W MOM BABY        Referral/Consult From: Rounding   Encounter Overview/Reason: Rituals, Rites and Sacraments  Service Provided For: Patient and family together, Significant other    Danay, Belief, Meaning:   Patient identifies as spiritual, is connected with a danay tradition or spiritual practice, and has beliefs or practices that help with coping during difficult times  Family/Friends identify as spiritual, are connected with a danay tradition or spiritual practice, and have beliefs or practices that help with coping during difficult times      Importance and Influence:  Patient has spiritual/personal beliefs that influence decisions regarding their health  Family/Friends have spiritual/personal beliefs that influence decisions regarding the patient's health    Community:  Patient feels well-supported. Support system includes: Spouse/Partner, Parent/s, and Children  Family/Friends feel well-supported. Support system includes: Spouse/Partner, Parent/s, and Children    Assessment and Plan of Care:     Patient Interventions include: Facilitated expression of thoughts and feelings  Family/Friends Interventions include: Facilitated expression of thoughts and feelings    Patient Plan of Care: Spiritual Care available upon further referral  Family/Friends Plan of Care: Spiritual Care available upon further referral    Electronically signed by VERNON Valente on 11/4/2024 at 4:10 PM

## 2024-11-04 NOTE — PROGRESS NOTES
SUBJECTIVE:   Patient without complaint    OBJECTIVE:   /70   Pulse 77   Temp 98 °F (36.7 °C) (Oral)   Resp 16   Ht 1.753 m (5' 9\")   Wt 115.2 kg (254 lb)   SpO2 96%   Breastfeeding Unknown   BMI 37.51 kg/m²    Lochia normal rubra   Uterus firm, nontender        Latest Reference Range & Units 11/03/24 10:24   WBC 4.5 - 11.5 k/uL 10.7   RBC 3.50 - 5.50 m/uL 3.57   Hemoglobin Quant 11.5 - 15.5 g/dL 8.0 (L)   Hematocrit 34.0 - 48.0 % 26.6 (L)   MCV 80.0 - 99.9 fL 74.5 (L)   MCH 26.0 - 35.0 pg 22.4 (L)   MCHC 32.0 - 34.5 g/dL 30.1 (L)   MPV 7.0 - 12.0 fL 12.4 (H)   RDW 11.5 - 15.0 % 17.3 (H)   Platelet, Fluorescence 130 - 450 k/uL 215   (L): Data is abnormally low  (H): Data is abnormally high  US EXTREMITY JOINT RIGHT NON VASC COMPLETE [[id number]]  Status: Final result     PACS Images     Show images for US EXTREMITY JOINT RIGHT NON VASC COMPLETE  US EXTREMITY JOINT RIGHT NON VASC COMPLETE  Order: [id number]  Status: Final result       Visible to patient: Yes (not seen)       Next appt: None    0 Result Notes  Details    Reading Physician Reading Date Result Priority   Nolan Angel MD  [phone] 11/3/2024      Narrative & Impression  EXAMINATION:  NONVASCULAR ULTRASOUND OF THE RIGHT EXTREMITY     11/3/2024 10:54 am     COMPARISON:  None.     HISTORY:  ORDERING SYSTEM PROVIDED HISTORY: axillary lumps  TECHNOLOGIST PROVIDED HISTORY:     Reason for exam:->axillary lumps  What reading provider will be dictating this exam?->CRC     FINDINGS:  Present in the right axillary region is a 2.3 cm x 0.9 cm x 2.2 cm oval  complex lesion.  Findings may represent enlarged complex lymph node or  possibly a fluid collection.     IMPRESSION:  2.3 cm x 0.9 cm x 2.2 cm oval complex lesion right axillary region. Findings  may represent enlarged complex lymph node or possibly a fluid collection.     RECOMMENDATION:  Close clinical follow-up and follow-up total resolution.              Exam Ended: 11/03/24 11:16 EST Last

## 2024-11-04 NOTE — FLOWSHEET NOTE
Perfect serve message sent to surgical resident, Dr. Bonifacio Freeman, and asked Dr. Valdovinos to see patient as soon as possible for surgical consult.

## 2024-11-04 NOTE — FLOWSHEET NOTE
This RN called Dr. Ferguson and notified him that Dr. Dimas saw patient, discontinued cephalexin, ordered vibramycin 100 mg po bid, for heat to be applied to bilateral axilla, and for patient to follow up with Dr. Jewell in one week. Dr. Ferguson notified that patient is requesting Norco prescription for at home. No new orders received from Dr. Ferguson at this time.

## 2024-11-04 NOTE — FLOWSHEET NOTE
Patient discharged to home in stable condition via wheelchair carrying infant on her lap in car seat. Patient and infant accompanied by FOB and patient's mother.

## 2024-11-04 NOTE — FLOWSHEET NOTE
Patient instructed on all of the above as stated in 1536 flowsheet above. Patient requested this RN to call Dr. Ferguson back to ask him to order hydrocodone for at home. This RN called Dr. Ferguson and notified him that patient has been taking hydrocodone 5-325 mg po q 6 hours prn as ordered by [facility] with last dose today at 1204. Dr. Ferguson stated that he will order hydrocodone for patient for at home as soon as possible. Patient instructed on all of the above with verbalized understanding.

## 2024-11-04 NOTE — FLOWSHEET NOTE
This RN perfect serve messaged Dr. Dimas and confirmed that patient may be discharged today from surgery standpoint and that she is to follow up with Dr. Jewell in one week.

## 2024-11-04 NOTE — FLOWSHEET NOTE
This RN called Dr. Bertrand's answering service and left a message for him to call this RN back. Message left that patient is discharged from OB service and awaiting his arrival for consult regarding left axilla cyst.

## 2024-11-04 NOTE — CONSULTS
lying in bed, NAD  Head: NCAT, PERRL, EOMI, red conjunctiva  Neck: supple, no masses, trachea midline  Lungs: symmetric chest rise, no audible wheezes  Heart: warm throughout  Abdomen: soft, non-distended, none tender to palpation throughout  Skin: warm and dry, no cyanosis.  Axilla with multiple areas of induration and surrounding.  Gu: no cva tenderness  Extremities: atraumatic, no focal motor deficits, no open wounds  Psych: no tremor, visual hallucinations    Radiology: I reviewed relevant abdominal imaging from this admission and that available in the EMR including ultrasound from admission    Assessment:  Manasa Spann is a 29 y.o. female with what seems most likely Morelos stage II hidradenitis suppurativa    Patient Active Problem List   Diagnosis    Encounter for elective induction of labor    History of loop electrosurgical excision procedure (LEEP) of cervix affecting pregnancy in second trimester    Alpha thalassemia silent carrier    Maternal obesity affecting pregnancy, antepartum    Hereditary disease in family possibly affecting fetus    Habitual aborter, antepartum, third trimester    Short cervix during pregnancy in third trimester    Poor fetal growth affecting management of mother in third trimester    37 weeks gestation of pregnancy    38 weeks gestation of pregnancy    History of LEEP (loop electrosurgical excision procedure) of cervix complicating pregnancy in third trimester       Plan:  -Okay for diet  -Doxy twice daily and clindamycin topical  -Pain control   -Follow-up with Dr. Bertrand as an outpatient      Judie Freeman DO  General Surgery Resident

## 2024-11-04 NOTE — PROGRESS NOTES
CLINICAL PHARMACY NOTE: MEDS TO BEDS    Total # of Prescriptions Filled: 1   The following medications were delivered to the patient:  Doxycycline 100 mg     Additional Documentation:  Clindamycin lotion sent to [facility]

## 2024-11-04 NOTE — FLOWSHEET NOTE
Dr. Bertrand returned phone call and asked to be added to treatment team. Dr. Bertrand also asked for this RN to perfect serve surgical resident on call.

## 2024-11-04 NOTE — LACTATION NOTE
Checked in on patient. Encouraged frequent feeds to establish milk supply. Educated on milk transition and engorgement, including what to expect and how to manage it. Encouraged frequent feeding, following baby's cues. Recommended cold compress for swelling and very limited warm compress for milk flow. Encouraged to reach out for support if engorgement lasts more than 48 hours, becomes severe, or if a fever develops. Lactation office # given if follow-up needed, as well as support group information. Encouraged to call with any concerns. Support and encouragement given.

## 2024-11-06 LAB — SURGICAL PATHOLOGY REPORT: NORMAL
